# Patient Record
Sex: MALE | HISPANIC OR LATINO | Employment: STUDENT | URBAN - METROPOLITAN AREA
[De-identification: names, ages, dates, MRNs, and addresses within clinical notes are randomized per-mention and may not be internally consistent; named-entity substitution may affect disease eponyms.]

---

## 2020-08-11 ENCOUNTER — HOSPITAL ENCOUNTER (EMERGENCY)
Facility: HOSPITAL | Age: 40
End: 2020-08-12
Attending: EMERGENCY MEDICINE | Admitting: EMERGENCY MEDICINE
Payer: COMMERCIAL

## 2020-08-11 DIAGNOSIS — F32.A ANXIETY AND DEPRESSION: Primary | ICD-10-CM

## 2020-08-11 DIAGNOSIS — F41.9 ANXIETY AND DEPRESSION: Primary | ICD-10-CM

## 2020-08-11 DIAGNOSIS — G47.00 INSOMNIA: ICD-10-CM

## 2020-08-11 LAB
AMPHETAMINES SERPL QL SCN: NEGATIVE
BARBITURATES UR QL: NEGATIVE
BENZODIAZ UR QL: NEGATIVE
COCAINE UR QL: NEGATIVE
ETHANOL EXG-MCNC: 0 MG/DL
METHADONE UR QL: NEGATIVE
OPIATES UR QL SCN: NEGATIVE
OXYCODONE+OXYMORPHONE UR QL SCN: NEGATIVE
PCP UR QL: NEGATIVE
SARS-COV-2 RNA RESP QL NAA+PROBE: NEGATIVE
THC UR QL: POSITIVE

## 2020-08-11 PROCEDURE — 80307 DRUG TEST PRSMV CHEM ANLYZR: CPT | Performed by: EMERGENCY MEDICINE

## 2020-08-11 PROCEDURE — 87635 SARS-COV-2 COVID-19 AMP PRB: CPT | Performed by: EMERGENCY MEDICINE

## 2020-08-11 PROCEDURE — 99284 EMERGENCY DEPT VISIT MOD MDM: CPT | Performed by: EMERGENCY MEDICINE

## 2020-08-11 PROCEDURE — 99285 EMERGENCY DEPT VISIT HI MDM: CPT

## 2020-08-11 PROCEDURE — 82075 ASSAY OF BREATH ETHANOL: CPT | Performed by: EMERGENCY MEDICINE

## 2020-08-11 RX ORDER — ACETAMINOPHEN 325 MG/1
650 TABLET ORAL ONCE
Status: COMPLETED | OUTPATIENT
Start: 2020-08-11 | End: 2020-08-11

## 2020-08-11 RX ORDER — LORAZEPAM 1 MG/1
1 TABLET ORAL ONCE
Status: COMPLETED | OUTPATIENT
Start: 2020-08-11 | End: 2020-08-11

## 2020-08-11 RX ORDER — TRAZODONE HYDROCHLORIDE 50 MG/1
50 TABLET ORAL ONCE
Status: COMPLETED | OUTPATIENT
Start: 2020-08-11 | End: 2020-08-11

## 2020-08-11 RX ORDER — TRAZODONE HYDROCHLORIDE 50 MG/1
25 TABLET ORAL
COMMUNITY
End: 2020-08-18 | Stop reason: HOSPADM

## 2020-08-11 RX ORDER — FLUOXETINE 10 MG/1
10 TABLET, FILM COATED ORAL DAILY
COMMUNITY
End: 2020-08-18 | Stop reason: HOSPADM

## 2020-08-11 RX ORDER — GABAPENTIN 300 MG/1
300 CAPSULE ORAL 3 TIMES DAILY
COMMUNITY
End: 2020-08-18 | Stop reason: HOSPADM

## 2020-08-11 RX ADMIN — ACETAMINOPHEN 650 MG: 325 TABLET ORAL at 19:57

## 2020-08-11 RX ADMIN — LORAZEPAM 1 MG: 1 TABLET ORAL at 14:15

## 2020-08-11 RX ADMIN — NICOTINE POLACRILEX 2 MG: 2 GUM, CHEWING BUCCAL at 23:19

## 2020-08-11 RX ADMIN — TRAZODONE HYDROCHLORIDE 25 MG: 50 TABLET ORAL at 23:03

## 2020-08-11 RX ADMIN — LORAZEPAM 1 MG: 1 TABLET ORAL at 19:57
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
## 2020-08-11 NOTE — ED NOTES
Pt moved into the hallway  Pt has a family member at bedside  Pt has no complaints at present time  Will continue to monitor        Juan R Strickland RN  08/11/20 8614

## 2020-08-11 NOTE — ED NOTES
There are currently no beds in network  Patient would like to go to [facility] if possible  Bed search to resume once 201 is completed

## 2020-08-11 NOTE — ED NOTES
Patient reports he was trying to get to the [facility] but they discharged him  He reports he has been anxious and has been very manic and has not been eating or drinking and really struggling  He says the hospital gave him a prozac but they sent him home to follow with his pcp  Patient has been very tearful and feels his anxiety is out of control  Patient states he cannot function and has been in the ED 3 x and doesnt feel safe at home and reports increased depression/anxiety and has been hitting himself or the wall  He says when he gets mad he will hit himself or the wall  Patient also reports feeling hopeless and helpless and fears he will lose control  He was sent by the [place] and they informed him they do not have a bed and he should go inpatient locally since they cannot accomodate his needs  Patient agreeable to sign a 201 for treatment

## 2020-08-12 ENCOUNTER — HOSPITAL ENCOUNTER (INPATIENT)
Facility: HOSPITAL | Age: 40
LOS: 6 days | Discharge: HOME | DRG: 885 | End: 2020-08-18
Attending: PSYCHIATRY & NEUROLOGY | Admitting: PSYCHIATRY & NEUROLOGY
Payer: COMMERCIAL

## 2020-08-12 VITALS
TEMPERATURE: 98.6 F | SYSTOLIC BLOOD PRESSURE: 120 MMHG | HEART RATE: 71 BPM | OXYGEN SATURATION: 98 % | DIASTOLIC BLOOD PRESSURE: 71 MMHG | RESPIRATION RATE: 16 BRPM

## 2020-08-12 DIAGNOSIS — F41.9 ANXIETY AND DEPRESSION: ICD-10-CM

## 2020-08-12 DIAGNOSIS — F39 MOOD DISORDER (HCC): Primary | ICD-10-CM

## 2020-08-12 DIAGNOSIS — F43.10 PTSD (POST-TRAUMATIC STRESS DISORDER): ICD-10-CM

## 2020-08-12 DIAGNOSIS — F41.9 ANXIETY DISORDER: ICD-10-CM

## 2020-08-12 DIAGNOSIS — F32.A ANXIETY AND DEPRESSION: ICD-10-CM

## 2020-08-12 RX ORDER — IBUPROFEN 600 MG/1
600 TABLET ORAL EVERY 6 HOURS PRN
Status: DISCONTINUED | OUTPATIENT
Start: 2020-08-12 | End: 2020-08-18 | Stop reason: HOSPADM

## 2020-08-12 RX ORDER — RISPERIDONE 1 MG/1
1 TABLET, ORALLY DISINTEGRATING ORAL
Status: DISCONTINUED | OUTPATIENT
Start: 2020-08-12 | End: 2020-08-18 | Stop reason: HOSPADM

## 2020-08-12 RX ORDER — IBUPROFEN 600 MG/1
600 TABLET ORAL EVERY 6 HOURS PRN
Status: CANCELLED | OUTPATIENT
Start: 2020-08-12

## 2020-08-12 RX ORDER — MAGNESIUM HYDROXIDE/ALUMINUM HYDROXICE/SIMETHICONE 120; 1200; 1200 MG/30ML; MG/30ML; MG/30ML
30 SUSPENSION ORAL EVERY 4 HOURS PRN
Status: CANCELLED | OUTPATIENT
Start: 2020-08-12

## 2020-08-12 RX ORDER — OLANZAPINE 10 MG/1
10 INJECTION, POWDER, LYOPHILIZED, FOR SOLUTION INTRAMUSCULAR EVERY 8 HOURS PRN
Status: CANCELLED | OUTPATIENT
Start: 2020-08-12

## 2020-08-12 RX ORDER — BENZTROPINE MESYLATE 1 MG/ML
1 INJECTION INTRAMUSCULAR; INTRAVENOUS EVERY 6 HOURS PRN
Status: DISCONTINUED | OUTPATIENT
Start: 2020-08-12 | End: 2020-08-18 | Stop reason: HOSPADM

## 2020-08-12 RX ORDER — LORAZEPAM 2 MG/ML
2 INJECTION INTRAMUSCULAR EVERY 6 HOURS PRN
Status: CANCELLED | OUTPATIENT
Start: 2020-08-12

## 2020-08-12 RX ORDER — TRAZODONE HYDROCHLORIDE 50 MG/1
50 TABLET ORAL
Status: CANCELLED | OUTPATIENT
Start: 2020-08-12

## 2020-08-12 RX ORDER — FLUOXETINE 10 MG/1
10 CAPSULE ORAL DAILY
Status: DISCONTINUED | OUTPATIENT
Start: 2020-08-12 | End: 2020-08-12 | Stop reason: HOSPADM

## 2020-08-12 RX ORDER — ACETAMINOPHEN 325 MG/1
325 TABLET ORAL EVERY 6 HOURS PRN
Status: DISCONTINUED | OUTPATIENT
Start: 2020-08-12 | End: 2020-08-18 | Stop reason: HOSPADM

## 2020-08-12 RX ORDER — OLANZAPINE 10 MG/1
10 TABLET ORAL EVERY 8 HOURS PRN
Status: DISCONTINUED | OUTPATIENT
Start: 2020-08-12 | End: 2020-08-18 | Stop reason: HOSPADM

## 2020-08-12 RX ORDER — BENZTROPINE MESYLATE 1 MG/1
1 TABLET ORAL EVERY 6 HOURS PRN
Status: DISCONTINUED | OUTPATIENT
Start: 2020-08-12 | End: 2020-08-18 | Stop reason: HOSPADM

## 2020-08-12 RX ORDER — LORAZEPAM 2 MG/ML
2 INJECTION INTRAMUSCULAR EVERY 6 HOURS PRN
Status: DISCONTINUED | OUTPATIENT
Start: 2020-08-12 | End: 2020-08-18 | Stop reason: HOSPADM

## 2020-08-12 RX ORDER — HALOPERIDOL 5 MG
5 TABLET ORAL EVERY 6 HOURS PRN
Status: DISCONTINUED | OUTPATIENT
Start: 2020-08-12 | End: 2020-08-18 | Stop reason: HOSPADM

## 2020-08-12 RX ORDER — LORAZEPAM 1 MG/1
1 TABLET ORAL ONCE
Status: COMPLETED | OUTPATIENT
Start: 2020-08-12 | End: 2020-08-12

## 2020-08-12 RX ORDER — OLANZAPINE 10 MG/1
10 TABLET ORAL EVERY 8 HOURS PRN
Status: CANCELLED | OUTPATIENT
Start: 2020-08-12

## 2020-08-12 RX ORDER — ACETAMINOPHEN 325 MG/1
650 TABLET ORAL EVERY 4 HOURS PRN
Status: DISCONTINUED | OUTPATIENT
Start: 2020-08-12 | End: 2020-08-18 | Stop reason: HOSPADM

## 2020-08-12 RX ORDER — ACETAMINOPHEN 325 MG/1
325 TABLET ORAL EVERY 6 HOURS PRN
Status: CANCELLED | OUTPATIENT
Start: 2020-08-12

## 2020-08-12 RX ORDER — LORAZEPAM 1 MG/1
1 TABLET ORAL EVERY 6 HOURS PRN
Status: CANCELLED | OUTPATIENT
Start: 2020-08-12

## 2020-08-12 RX ORDER — HYDROXYZINE HYDROCHLORIDE 25 MG/1
25 TABLET, FILM COATED ORAL EVERY 6 HOURS PRN
Status: DISCONTINUED | OUTPATIENT
Start: 2020-08-12 | End: 2020-08-18 | Stop reason: HOSPADM

## 2020-08-12 RX ORDER — RISPERIDONE 1 MG/1
1 TABLET, ORALLY DISINTEGRATING ORAL
Status: CANCELLED | OUTPATIENT
Start: 2020-08-12

## 2020-08-12 RX ORDER — GABAPENTIN 300 MG/1
300 CAPSULE ORAL 3 TIMES DAILY
Status: DISCONTINUED | OUTPATIENT
Start: 2020-08-12 | End: 2020-08-12 | Stop reason: HOSPADM

## 2020-08-12 RX ORDER — HALOPERIDOL 5 MG/ML
5 INJECTION INTRAMUSCULAR EVERY 6 HOURS PRN
Status: CANCELLED | OUTPATIENT
Start: 2020-08-12

## 2020-08-12 RX ORDER — GABAPENTIN 300 MG/1
300 CAPSULE ORAL 3 TIMES DAILY
Status: DISCONTINUED | OUTPATIENT
Start: 2020-08-13 | End: 2020-08-13

## 2020-08-12 RX ORDER — HALOPERIDOL 5 MG/ML
5 INJECTION INTRAMUSCULAR EVERY 6 HOURS PRN
Status: DISCONTINUED | OUTPATIENT
Start: 2020-08-12 | End: 2020-08-18 | Stop reason: HOSPADM

## 2020-08-12 RX ORDER — FLUOXETINE 10 MG/1
10 CAPSULE ORAL DAILY
Status: CANCELLED | OUTPATIENT
Start: 2020-08-13

## 2020-08-12 RX ORDER — HALOPERIDOL 5 MG
5 TABLET ORAL EVERY 6 HOURS PRN
Status: CANCELLED | OUTPATIENT
Start: 2020-08-12

## 2020-08-12 RX ORDER — MAGNESIUM HYDROXIDE/ALUMINUM HYDROXICE/SIMETHICONE 120; 1200; 1200 MG/30ML; MG/30ML; MG/30ML
30 SUSPENSION ORAL EVERY 4 HOURS PRN
Status: DISCONTINUED | OUTPATIENT
Start: 2020-08-12 | End: 2020-08-18 | Stop reason: HOSPADM

## 2020-08-12 RX ORDER — OLANZAPINE 10 MG/1
10 INJECTION, POWDER, LYOPHILIZED, FOR SOLUTION INTRAMUSCULAR EVERY 8 HOURS PRN
Status: DISCONTINUED | OUTPATIENT
Start: 2020-08-12 | End: 2020-08-18 | Stop reason: HOSPADM

## 2020-08-12 RX ORDER — BENZTROPINE MESYLATE 0.5 MG/1
1 TABLET ORAL EVERY 6 HOURS PRN
Status: CANCELLED | OUTPATIENT
Start: 2020-08-12

## 2020-08-12 RX ORDER — FLUOXETINE 10 MG/1
10 CAPSULE ORAL DAILY
Status: DISCONTINUED | OUTPATIENT
Start: 2020-08-13 | End: 2020-08-13

## 2020-08-12 RX ORDER — HYDROXYZINE 50 MG/1
50 TABLET, FILM COATED ORAL EVERY 6 HOURS PRN
Status: DISCONTINUED | OUTPATIENT
Start: 2020-08-12 | End: 2020-08-18 | Stop reason: HOSPADM

## 2020-08-12 RX ORDER — BENZTROPINE MESYLATE 1 MG/ML
1 INJECTION INTRAMUSCULAR; INTRAVENOUS EVERY 6 HOURS PRN
Status: CANCELLED | OUTPATIENT
Start: 2020-08-12

## 2020-08-12 RX ORDER — OLANZAPINE 5 MG/1
10 TABLET, ORALLY DISINTEGRATING ORAL ONCE
Status: COMPLETED | OUTPATIENT
Start: 2020-08-12 | End: 2020-08-12

## 2020-08-12 RX ORDER — HYDROXYZINE HYDROCHLORIDE 25 MG/1
25 TABLET, FILM COATED ORAL EVERY 6 HOURS PRN
Status: CANCELLED | OUTPATIENT
Start: 2020-08-12

## 2020-08-12 RX ORDER — GABAPENTIN 300 MG/1
300 CAPSULE ORAL 3 TIMES DAILY
Status: CANCELLED | OUTPATIENT
Start: 2020-08-12

## 2020-08-12 RX ORDER — HYDROXYZINE HYDROCHLORIDE 25 MG/1
50 TABLET, FILM COATED ORAL EVERY 6 HOURS PRN
Status: CANCELLED | OUTPATIENT
Start: 2020-08-12

## 2020-08-12 RX ORDER — LORAZEPAM 1 MG/1
1 TABLET ORAL EVERY 6 HOURS PRN
Status: DISCONTINUED | OUTPATIENT
Start: 2020-08-12 | End: 2020-08-18 | Stop reason: HOSPADM

## 2020-08-12 RX ORDER — ACETAMINOPHEN 325 MG/1
650 TABLET ORAL EVERY 4 HOURS PRN
Status: CANCELLED | OUTPATIENT
Start: 2020-08-12

## 2020-08-12 RX ORDER — TRAZODONE HYDROCHLORIDE 50 MG/1
50 TABLET ORAL
Status: DISCONTINUED | OUTPATIENT
Start: 2020-08-12 | End: 2020-08-18 | Stop reason: HOSPADM

## 2020-08-12 RX ADMIN — OLANZAPINE 10 MG: 5 TABLET, ORALLY DISINTEGRATING ORAL at 13:50

## 2020-08-12 RX ADMIN — GABAPENTIN 300 MG: 300 CAPSULE ORAL at 10:24

## 2020-08-12 RX ADMIN — FLUOXETINE 10 MG: 10 CAPSULE ORAL at 10:24

## 2020-08-12 RX ADMIN — TRAZODONE HYDROCHLORIDE 50 MG: 50 TABLET ORAL at 22:11

## 2020-08-12 RX ADMIN — LORAZEPAM 1 MG: 1 TABLET ORAL at 10:27
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
## 2020-08-12 NOTE — ED NOTES
Spoke with [facility] and they will not be able to take patient because he has [facility] insurance       Bed search:   [facility]: can't take because precert not completed with [facility] yet    [place]: [facility]- no beds   [facility]: [facility] beds   [facility]: no answer, left message   [facility]:no beds   [facility]- no in network beds  [facility]

## 2020-08-12 NOTE — EMTALA/ACUTE CARE TRANSFER
[facility] EMERGENCY DEPARTMENT  [address] [facility] [address]  Dept: [phone]      NHJABQ TRANSFER CONSENT    NAME Tess SANTIAGO 1980                              MRN [id number]    I have been informed of my rights regarding examination, treatment, and transfer   by Dr Erika Spears MD    Benefits: Specialized equipment and/or services available at the receiving facility (Include comment)________________________(inpatient psychiatry)    Risks: Potential for delay in receiving treatment, Increased discomfort during transfer, Potential deterioration of medical condition      Consent for Transfer:  I acknowledge that my medical condition has been evaluated and explained to me by the emergency department physician or other qualified medical person and/or my attending physician, who has recommended that I be transferred to the service of  Accepting Physician: Devika Peraza   at [address] [place] [address] Name, [address] : [facility]  The above potential benefits of such transfer, the potential risks associated with such transfer, and the probable risks of not being transferred have been explained to me, and I fully understand them  The doctor has explained that, in my case, the benefits of transfer outweigh the risks  I agree to be transferred  I authorize the performance of emergency medical procedures and treatments upon me in both transit and upon arrival at the receiving facility  Additionally, I authorize the release of any and all medical records to the receiving facility and request they be transported with me, if possible  I understand that the safest mode of transportation during a medical emergency is an ambulance and that the Hospital advocates the use of this mode of transport   Risks of traveling to the receiving facility by car, including absence of medical control, life sustaining equipment, such as oxygen, and medical personnel has been explained to me and I fully understand them  ([place] CORRECT BOX BELOW)  [  ]  I consent to the stated transfer and to be transported by ambulance/helicopter  [  ]  I consent to the stated transfer, but refuse transportation by ambulance and accept full responsibility for my transportation by car  I understand the risks of non-ambulance transfers and I exonerate the Hospital and its staff from any deterioration in my condition that results from this refusal     X___________________________________________    DATE  20  TIME________  Signature of patient or legally responsible individual signing on patient behalf           RELATIONSHIP TO PATIENT_________________________          Provider Certification    [facility] 1980                              MRN [id number]    A medical screening exam was performed on the above named patient  Based on the examination:    Condition Necessitating Transfer The primary encounter diagnosis was Anxiety and depression  A diagnosis of Insomnia was also pertinent to this visit      Patient Condition: The patient has been stabilized such that within reasonable medical probability, no material deterioration of the patient condition or the condition of the unborn child(rosemarie) is likely to result from the transfer    Reason for Transfer:      Transfer Requirements: [address]   · Space available and qualified personnel available for treatment as acknowledged by    · Agreed to accept transfer and to provide appropriate medical treatment as acknowledged by       Odalis Pennington    · Appropriate medical records of the examination and treatment of the patient are provided at the time of transfer   [address] [facility], [address] _______  · Transfer will be performed by qualified personnel from    and appropriate transfer equipment as required, including the use of necessary and appropriate life support measures  Provider Certification: I have examined the patient and explained the following risks and benefits of being transferred/refusing transfer to the patient/family:  General risk, such as traffic hazards, adverse weather conditions, rough terrain or turbulence, possible failure of equipment (including vehicle or aircraft), or consequences of actions of persons outside the control of the transport personnel, Risk of worsening condition, The possibility of a transport vehicle being unavailable, Unanticipated needs of medical equipment and personnel during transport      Based on these reasonable risks and benefits to the patient and/or the unborn child(rosemarie), and based upon the information available at the time of the patients examination, I certify that the medical benefits reasonably to be expected from the provision of appropriate medical treatments at another medical facility outweigh the increasing risks, if any, to the individuals medical condition, and in the case of labor to the unborn child, from effecting the transfer      X____________________________________________ DATE 08/12/20        TIME_______      ORIGINAL - SEND TO MEDICAL RECORDS   COPY - SEND WITH PATIENT DURING TRANSFER

## 2020-08-12 NOTE — ED NOTES
Patient is accepted at [facility]  Patient is accepted by Gail Mejia  per  [address] is arranged with [facility]  Transportation is scheduled for 730pm    Nurse report is to be called to [phone]    prior to patient transfer

## 2020-08-12 NOTE — ED CARE HANDOFF
Emergency Department Sign Out Note        Sign out and transfer of care from Dr Eber Kim  See Separate Emergency Department note  The patient, Thaddeus Patel, was evaluated by the previous provider for anxiety and manic behavior  Workup Completed:  Patient medically cleared and evaluated by Crisis  201 signed for voluntary psychiatric hospitalization  Placement pending  ED Course / Workup Pending (followup): Patient's home medications ordered  Patient states worsening anxiety and requesting more ativan  1 mg oral ativan ordered  Patient later had to be given zyprexa for agitation with improvement  Patient accepted at [facility]  Procedures  MDM    Disposition  Final diagnoses:   Anxiety and depression   Insomnia     Time reflects when diagnosis was documented in both MDM as applicable and the Disposition within this note     Time User Action Codes Description Comment    8/11/2020  5:04 PM Fortunato Martínez [F41 9,  F32 9] Anxiety and depression     8/11/2020  5:04 PM Fortunato Martínez [G47 00] Insomnia       ED Disposition     ED Disposition Condition Date/Time Comment    Transfer to [address] [place] [address] Aug 11, 2020  1:56 PM Thaddeus Patel should be transferred out to inpatient psych and has been medically cleared          MD Documentation      Most Recent Value   Patient Condition  The patient has been stabilized such that within reasonable medical probability, no material deterioration of the patient condition or the condition of the unborn child(rosemarie) is likely to result from the transfer   Benefits of Transfer  Specialized equipment and/or services available at the receiving facility (Include comment)________________________ [inpatient psychiatry]   Risks of Transfer  Potential for delay in receiving treatment, Increased discomfort during transfer, Potential deterioration of medical condition   Accepting Physician  Mitchell Barragan Name, [address] Santhosh Ortiz Ace   Provider Certification  General risk, such as traffic hazards, adverse weather conditions, rough terrain or turbulence, possible failure of equipment (including vehicle or aircraft), or consequences of actions of persons outside the control of the transport personnel, Risk of worsening condition, The possibility of a transport vehicle being unavailable, Unanticipated needs of medical equipment and personnel during transport      RN Documentation      Most [facility] Name, [address] [place] [address]    None       Patient's Medications   Discharge Prescriptions    No medications on file     No discharge procedures on file         ED Provider  Electronically Signed by     Daljit Garcia MD  08/12/20 1756

## 2020-08-12 NOTE — ED NOTES
Bed search in progress  No beds at the [place]  No network beds available at this time      Bed search:   Helio Garay- clinical faxed for review  Amy Pate: [facility], [place]- no beds available   [place]: [facility]- clinical faxed for review  [address] [facility]: [place]- no beds  [facility]: [facility]- no beds  [place]: [facility]- no beds

## 2020-08-13 PROBLEM — F43.10 PTSD (POST-TRAUMATIC STRESS DISORDER): Status: ACTIVE | Noted: 2020-08-13

## 2020-08-13 PROBLEM — Z00.8 MEDICAL CLEARANCE FOR PSYCHIATRIC ADMISSION: Status: ACTIVE | Noted: 2020-08-13

## 2020-08-13 PROBLEM — F41.9 ANXIETY DISORDER: Status: ACTIVE | Noted: 2020-08-13

## 2020-08-13 PROBLEM — F39 MOOD DISORDER (HCC): Status: ACTIVE | Noted: 2020-08-13

## 2020-08-13 LAB
ALBUMIN SERPL BCP-MCNC: 3.3 G/DL (ref 3.5–5)
ALP SERPL-CCNC: 55 U/L (ref 46–116)
ALT SERPL W P-5'-P-CCNC: 21 U/L (ref 12–78)
ANION GAP SERPL CALCULATED.3IONS-SCNC: 6 MMOL/L (ref 4–13)
AST SERPL W P-5'-P-CCNC: 14 U/L (ref 5–45)
BASOPHILS # BLD AUTO: 0.04 THOUSANDS/ΜL (ref 0–0.1)
BASOPHILS NFR BLD AUTO: 1 % (ref 0–1)
BILIRUB SERPL-MCNC: 0.4 MG/DL (ref 0.2–1)
BILIRUB UR QL STRIP: NEGATIVE
BUN SERPL-MCNC: 13 MG/DL (ref 5–25)
CALCIUM SERPL-MCNC: 8.3 MG/DL (ref 8.3–10.1)
CHLORIDE SERPL-SCNC: 105 MMOL/L (ref 100–108)
CHOLEST SERPL-MCNC: 135 MG/DL (ref 50–200)
CLARITY UR: CLEAR
CO2 SERPL-SCNC: 31 MMOL/L (ref 21–32)
COLOR UR: YELLOW
CREAT SERPL-MCNC: 1.03 MG/DL (ref 0.6–1.3)
EOSINOPHIL # BLD AUTO: 0.11 THOUSAND/ΜL (ref 0–0.61)
EOSINOPHIL NFR BLD AUTO: 1 % (ref 0–6)
ERYTHROCYTE [DISTWIDTH] IN BLOOD BY AUTOMATED COUNT: 12.6 % (ref 11.6–15.1)
EST. AVERAGE GLUCOSE BLD GHB EST-MCNC: 97 MG/DL
GFR SERPL CREATININE-BSD FRML MDRD: 92 ML/MIN/1.73SQ M
GLUCOSE SERPL-MCNC: 91 MG/DL (ref 65–140)
GLUCOSE UR STRIP-MCNC: NEGATIVE MG/DL
HBA1C MFR BLD: 5 %
HCT VFR BLD AUTO: 45.4 % (ref 36.5–49.3)
HDLC SERPL-MCNC: 42 MG/DL
HGB BLD-MCNC: 15.4 G/DL (ref 12–17)
HGB UR QL STRIP.AUTO: NEGATIVE
IMM GRANULOCYTES # BLD AUTO: 0.03 THOUSAND/UL (ref 0–0.2)
IMM GRANULOCYTES NFR BLD AUTO: 0 % (ref 0–2)
KETONES UR STRIP-MCNC: NEGATIVE MG/DL
LDLC SERPL CALC-MCNC: 76 MG/DL (ref 0–100)
LEUKOCYTE ESTERASE UR QL STRIP: NEGATIVE
LYMPHOCYTES # BLD AUTO: 3.36 THOUSANDS/ΜL (ref 0.6–4.47)
LYMPHOCYTES NFR BLD AUTO: 39 % (ref 14–44)
MCH RBC QN AUTO: 30.9 PG (ref 26.8–34.3)
MCHC RBC AUTO-ENTMCNC: 33.9 G/DL (ref 31.4–37.4)
MCV RBC AUTO: 91 FL (ref 82–98)
MONOCYTES # BLD AUTO: 0.67 THOUSAND/ΜL (ref 0.17–1.22)
MONOCYTES NFR BLD AUTO: 8 % (ref 4–12)
NEUTROPHILS # BLD AUTO: 4.33 THOUSANDS/ΜL (ref 1.85–7.62)
NEUTS SEG NFR BLD AUTO: 51 % (ref 43–75)
NITRITE UR QL STRIP: NEGATIVE
NONHDLC SERPL-MCNC: 93 MG/DL
NRBC BLD AUTO-RTO: 0 /100 WBCS
PH UR STRIP.AUTO: 7.5 [PH]
PLATELET # BLD AUTO: 238 THOUSANDS/UL (ref 149–390)
PMV BLD AUTO: 11.2 FL (ref 8.9–12.7)
POTASSIUM SERPL-SCNC: 3.4 MMOL/L (ref 3.5–5.3)
PROT SERPL-MCNC: 6.8 G/DL (ref 6.4–8.2)
PROT UR STRIP-MCNC: NEGATIVE MG/DL
RBC # BLD AUTO: 4.98 MILLION/UL (ref 3.88–5.62)
RPR SER QL: NORMAL
SODIUM SERPL-SCNC: 142 MMOL/L (ref 136–145)
SP GR UR STRIP.AUTO: 1.03 (ref 1–1.03)
TRIGL SERPL-MCNC: 86 MG/DL
TSH SERPL DL<=0.05 MIU/L-ACNC: 1.01 UIU/ML (ref 0.36–3.74)
UROBILINOGEN UR QL STRIP.AUTO: 1 E.U./DL
WBC # BLD AUTO: 8.54 THOUSAND/UL (ref 4.31–10.16)

## 2020-08-13 PROCEDURE — 80053 COMPREHEN METABOLIC PANEL: CPT | Performed by: PHYSICIAN ASSISTANT

## 2020-08-13 PROCEDURE — 81003 URINALYSIS AUTO W/O SCOPE: CPT | Performed by: PHYSICIAN ASSISTANT

## 2020-08-13 PROCEDURE — 99253 IP/OBS CNSLTJ NEW/EST LOW 45: CPT | Performed by: INTERNAL MEDICINE

## 2020-08-13 PROCEDURE — 84443 ASSAY THYROID STIM HORMONE: CPT | Performed by: PHYSICIAN ASSISTANT

## 2020-08-13 PROCEDURE — 83036 HEMOGLOBIN GLYCOSYLATED A1C: CPT | Performed by: PHYSICIAN ASSISTANT

## 2020-08-13 PROCEDURE — 80061 LIPID PANEL: CPT | Performed by: PHYSICIAN ASSISTANT

## 2020-08-13 PROCEDURE — 86592 SYPHILIS TEST NON-TREP QUAL: CPT | Performed by: PHYSICIAN ASSISTANT

## 2020-08-13 PROCEDURE — 85025 COMPLETE CBC W/AUTO DIFF WBC: CPT | Performed by: PHYSICIAN ASSISTANT

## 2020-08-13 PROCEDURE — 99221 1ST HOSP IP/OBS SF/LOW 40: CPT | Performed by: PSYCHIATRY & NEUROLOGY

## 2020-08-13 RX ORDER — NICOTINE 21 MG/24HR
1 PATCH, TRANSDERMAL 24 HOURS TRANSDERMAL DAILY
Status: DISCONTINUED | OUTPATIENT
Start: 2020-08-13 | End: 2020-08-18 | Stop reason: HOSPADM

## 2020-08-13 RX ORDER — FLUOXETINE HYDROCHLORIDE 20 MG/1
20 CAPSULE ORAL DAILY
Status: DISCONTINUED | OUTPATIENT
Start: 2020-08-14 | End: 2020-08-18 | Stop reason: HOSPADM

## 2020-08-13 RX ORDER — GABAPENTIN 400 MG/1
400 CAPSULE ORAL 3 TIMES DAILY
Status: DISCONTINUED | OUTPATIENT
Start: 2020-08-13 | End: 2020-08-14

## 2020-08-13 RX ORDER — QUETIAPINE FUMARATE 100 MG/1
100 TABLET, FILM COATED ORAL
Status: DISCONTINUED | OUTPATIENT
Start: 2020-08-13 | End: 2020-08-14

## 2020-08-13 RX ADMIN — NICOTINE POLACRILEX 4 MG: 4 GUM, CHEWING BUCCAL at 12:20

## 2020-08-13 RX ADMIN — NICOTINE POLACRILEX 4 MG: 4 GUM, CHEWING BUCCAL at 16:54

## 2020-08-13 RX ADMIN — GABAPENTIN 400 MG: 400 CAPSULE ORAL at 16:30

## 2020-08-13 RX ADMIN — GABAPENTIN 300 MG: 300 CAPSULE ORAL at 08:43

## 2020-08-13 RX ADMIN — NICOTINE 1 PATCH: 21 PATCH, EXTENDED RELEASE TRANSDERMAL at 12:19

## 2020-08-13 RX ADMIN — LORAZEPAM 1 MG: 1 TABLET ORAL at 16:32

## 2020-08-13 RX ADMIN — FLUOXETINE 10 MG: 10 CAPSULE ORAL at 08:43

## 2020-08-13 RX ADMIN — QUETIAPINE FUMARATE 100 MG: 100 TABLET ORAL at 21:14

## 2020-08-13 RX ADMIN — LORAZEPAM 1 MG: 1 TABLET ORAL at 08:44

## 2020-08-13 RX ADMIN — GABAPENTIN 400 MG: 400 CAPSULE ORAL at 21:14

## 2020-08-13 NOTE — PROGRESS NOTES
Patient arrived with the following belongings:  -Caitlin Rizo  -socks  -jeans  -tshirt  -gloves  -2 tank tops  -soundproof headphones  -2 chargers  -tablet  -laptop  -journal   -sneakers  -7 plastic bags  -sunscreen  -pen  -highlighter  -waterproof backpack cover  -backpack  -construction vest  -school papers  -smoking cones  -waterbottle (tobacco inside)  -deoderant  -notepad  -wallet  -$3 00   -apple card  -movie card  -best buy card  -ae card   -target card   -bjs card  -veterans card  -[place] photo card   -ID  -EBT card  -2 septa cards  -school ID   - 4 bank cards

## 2020-08-13 NOTE — PROGRESS NOTES
08/13/20 1000 08/13/20 1100 08/13/20 1315   Activity/Group Checklist   Group Community meeting  (goal setting) Other (Comment)  (progresive muscle relaxation) Life Skills  (Effective Communication )   Attendance Attended Attended Attended   Attendance Duration (min) 31-45 16-30 46-60  (left group and returned )   Interactions Interacted appropriately  (engaged spontaneously in discussion) Interacted appropriately Interacted appropriately  (frequent interaction, deep disclosure)   Affect/Mood Appropriate Appropriate Appropriate   Goals Achieved Able to listen to others; Able to reflect/comment on own behavior;Able to engage in interactions; Discussed coping strategies; Identified feelings Able to engage in interactions; Able to reflect/comment on own behavior;Able to listen to others; Identified feelings; Discussed coping strategies Identified feelings; Identified triggers; Discussed coping strategies; Discussed self-esteem issues; Displayed empathy;Able to listen to others; Able to engage in interactions; Able to reflect/comment on own behavior;Able to manage/cope with feelings; Able to self-disclose; Able to recieve feedback; Able to give feedback to another; Other (Comment)  (focused on relationship, appears motivated & invested )      08/13/20 1440   Activity/Group Checklist   Group Admission/Discharge  (Admission self assessment)   Attendance Attended   Attendance Duration (min) 16-30  (spoke at length about relationship stressors)   Interactions Interacted appropriately   Affect/Mood Appropriate   Goals Achieved Identified feelings; Identified triggers; Discussed coping strategies; Able to engage in interactions; Able to reflect/comment on own behavior;Able to manage/cope with feelings  (goal for discharge is to be able to manage anxiety)   Pt attended all therapeutic groups today  He presented as pleasant and cooperative during interactions  Pt was social with select peers and frequently contributed to group discussion    Pt reflected on stressors leading to admission and verbalized his goal as to be able to manage anxiety more effectively
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
## 2020-08-13 NOTE — PLAN OF CARE
Problem: Depression  Goal: Treatment Goal: Demonstrate behavioral control of depressive symptoms, verbalize feelings of improved mood/affect, and adopt new coping skills prior to discharge  Outcome: Progressing  Goal: Verbalize thoughts and feelings  Description: Interventions:  - Assess and re-assess patient's level of risk   - Engage patient in 1:1 interactions, daily, for a minimum of 15 minutes   - Encourage patient to express feelings, fears, frustrations, hopes   Outcome: Progressing  Goal: Refrain from harming self  Description: Interventions:  - Monitor patient closely, per order   - Supervise medication ingestion, monitor effects and side effects   Outcome: Progressing  Goal: Attend and participate in unit activities, including therapeutic, recreational, and educational groups  Description: Interventions:  - Provide therapeutic and educational activities daily, encourage attendance and participation, and document same in the medical record   Outcome: Progressing     Problem: Anxiety  Goal: Anxiety is at manageable level  Description: Interventions:  - Assess and monitor patient's anxiety level  - Monitor for signs and symptoms (heart palpitations, chest pain, shortness of breath, headaches, nausea, feeling jumpy, restlessness, irritable, apprehensive)  - Collaborate with interdisciplinary team and initiate plan and interventions as ordered    - [place] patient to unit/surroundings  - Explain treatment plan  - Encourage participation in care  - Encourage verbalization of concerns/fears  - Identify coping mechanisms  - Assist in developing anxiety-reducing skills  - Administer/offer alternative therapies  - Limit or eliminate stimulants  Outcome: Progressing     Problem: INFECTION - ADULT  Goal: Absence or prevention of progression during hospitalization  Description: INTERVENTIONS:  - Assess and monitor for signs and symptoms of infection  - Monitor lab/diagnostic results  - Monitor all insertion sites, i e  indwelling lines, tubes, and drains  - Monitor endotracheal if appropriate and nasal secretions for changes in amount and color  - [place] appropriate cooling/warming therapies per order  - Administer medications as ordered  - Instruct and encourage patient and family to use good hand hygiene technique  - Identify and instruct in appropriate isolation precautions for identified infection/condition  Outcome: Progressing

## 2020-08-13 NOTE — PROGRESS NOTES
Pt awoke with sever anxiety, story score=25  States he always wakes up like this  Given prn ativan 1mg at 0844  Pt then went to [facility], and afterward states prn was effective and he was feeling better  Has been on the phone several times with boyfriend, at times tearful  Pt does appear depressed and overwhelmed, did not eat any lunch, but did eat 75% of bkfst  Adjusting to unit mileu  No episodes of acute anxiety or agitation

## 2020-08-13 NOTE — PROGRESS NOTES
Pt is noted to be at the phone for most of the beginning of the shift  Pt reports to this writer that "my anxiety just starts from the moment I wake up  I feel like I sit down I'll start shaking"  Pt rates his anxiety 2/10 and his depression 5/10  Pt reports his depression is because he is "not able to be with my family"  Pt denies SI/HI/AVH and contracts for safety  Pt denies any questions or concerns at the present time

## 2020-08-13 NOTE — TREATMENT PLAN
TREATMENT PLAN REVIEW - [address] [facility] 42 y o  [birth year] male MRN: [id number]    [address] [place] [address] Room / Bed: [facility] 895-60 Encounter: [id number]          Admit Date/Time:  8/12/2020  7:25 PM    Treatment Team: Attending Provider: Aryan Brown DO; Patient Care Assistant: Marcelino Dale; Registered Nurse: Anais Kitchen, RN; Medications RN: Ishan Zamorano RN; : Breonna Calderon; Occupational Therapy Assistant: Juan Fontana, [facility];  Registered Nurse: Brionna Ariza RN; Security: [place] Pop    Diagnosis: Principal Problem:    Mood disorder [facility])  Active Problems:    Anxiety disorder    PTSD (post-traumatic stress disorder)      Patient Strengths/Assets: average or above intelligence, cooperative, motivation for treatment/growth, patient is on a voluntary commitment    Patient Barriers/Limitations: relationship stressors    Short Term Goals: decrease in depressive symptoms, decrease in anxiety symptoms, decrease in self abusive behaviors, mood stabilization    Long Term Goals: improvement in anxiety, resolution of depressive symptoms, stabilization of mood, no self abusive behavior    Progress Towards Goals: starting psychiatric medications as prescribed    Recommended Treatment: medication management, patient medication education, group therapy, milieu therapy, continued Behavioral Health psychiatric evaluation/assessment process    Treatment Frequency: daily medication monitoring, group and milieu therapy daily, monitoring through interdisciplinary rounds, monitoring through weekly patient care conferences    Expected Discharge Date:  5-7 midnight    Discharge Plan: referrals as indicated    Treatment Plan Created/Updated By: Aryan Brown DO

## 2020-08-13 NOTE — CONSULTS
Consult- Birgit Swartz [birth year], 42 y o  male MRN: [id number]    Unit/Bed#: [facility] 626-51 Encounter: [id number]    Primary Care Provider: No primary care provider on file  Date and time admitted to hospital: 8/12/2020  7:25 PM      Inpatient consult for Medical Clearance for [address] patient  Consult performed by: Melissa Barrera MD  Consult ordered by: Chip Steiner PA-C          * Mood disorder [facility])  Assessment & Plan  Treatment as per Psychiatry    Anxiety disorder  Assessment & Plan  Treatment as per Psychiatry    Medical clearance for psychiatric admission  Assessment & Plan  Patient is medically stable for psychiatric treatment        History of Present Illness:    Birgit Swartz is a 42 y o  male who is originally admitted to the [facility] on 8/12/2020 due to increased anxiety, panic attacks  We are consulted for medical clearance  Patient reports increased anxiety, panic attacks and he was hurting himself over the weekend hitting walls  Currently complains of mild pain at the right wrist at the volar aspect after eating the wall over the weekend but he has full range of motion in the right wrist and on physical examination he has very mild tenderness  I doubt fracture  Otherwise patient medically feels well has no complaints  Review of Systems:    Review of Systems   Constitutional: Negative for fever  HENT: Negative for congestion  Eyes: Negative for visual disturbance  Respiratory: Negative for cough and shortness of breath  Cardiovascular: Negative for chest pain, palpitations and leg swelling  Gastrointestinal: Negative for abdominal pain, blood in stool and diarrhea  Endocrine: Negative for polydipsia and polyphagia  Genitourinary: Negative for difficulty urinating and dysuria  Skin: Negative for rash  Neurological: Negative for weakness, numbness and headaches  Hematological: Negative for adenopathy  Psychiatric/Behavioral: Positive for dysphoric mood   The patient is nervous/anxious  All other systems reviewed and are negative        Past Medical and Surgical History:     Past Medical History:   Diagnosis Date    H  pylori infection     TBI (traumatic brain injury) ([facility] [address] )        Past Surgical History:   Procedure Laterality Date    EYE SURGERY      HAND SURGERY      SECONDARY INTRAOCULAR LENSE IMPLANTATION         Meds/Allergies:      Current Facility-Administered Medications:     acetaminophen (TYLENOL) tablet 325 mg, 325 mg, Oral, Q6H PRN    acetaminophen (TYLENOL) tablet 650 mg, 650 mg, Oral, Q4H PRN    aluminum-magnesium hydroxide-simethicone (MYLANTA) 200-200-20 mg/5 mL oral suspension 30 mL, 30 mL, Oral, Q4H PRN    benztropine (COGENTIN) injection 1 mg, 1 mg, Intramuscular, Q6H PRN    benztropine (COGENTIN) tablet 1 mg, 1 mg, Oral, Q6H PRN    [START ON 8/14/2020] FLUoxetine (PROzac) capsule 20 mg, 20 mg, Oral, Daily    gabapentin (NEURONTIN) capsule 400 mg, 400 mg, Oral, TID, 400 mg at 08/13/20 1630    haloperidol (HALDOL) tablet 5 mg, 5 mg, Oral, Q6H PRN    haloperidol lactate (HALDOL) injection 5 mg, 5 mg, Intramuscular, Q6H PRN    hydrOXYzine HCL (ATARAX) tablet 25 mg, 25 mg, Oral, Q6H PRN    hydrOXYzine HCL (ATARAX) tablet 50 mg, 50 mg, Oral, Q6H PRN    ibuprofen (MOTRIN) tablet 600 mg, 600 mg, Oral, Q6H PRN    LORazepam (ATIVAN) injection 2 mg, 2 mg, Intramuscular, Q6H PRN    LORazepam (ATIVAN) tablet 1 mg, 1 mg, Oral, Q6H PRN, 1 mg at 08/13/20 1632    magnesium hydroxide (MILK OF MAGNESIA) 400 mg/5 mL oral suspension 30 mL, 30 mL, Oral, Daily PRN    nicotine (NICODERM CQ) 21 mg/24 hr TD 24 hr patch 1 patch, 1 patch, Transdermal, Daily, 1 patch at 08/13/20 1219    nicotine polacrilex (NICORETTE) gum 4 mg, 4 mg, Oral, Q2H PRN, 4 mg at 08/13/20 1654    OLANZapine (ZyPREXA) IM injection 10 mg, 10 mg, Intramuscular, Q8H PRN    OLANZapine (ZyPREXA) tablet 10 mg, 10 mg, Oral, Q8H PRN    QUEtiapine (SEROquel) tablet 100 mg, 100 mg, Oral, HS    risperiDONE (RisperDAL M-TABS) dispersible tablet 1 mg, 1 mg, Oral, Q3H PRN    traZODone (DESYREL) tablet 50 mg, 50 mg, Oral, HS PRN, 50 mg at 08/12/20 2211    Prior to Admission Medications   Prescriptions Last Dose Informant Patient Reported? Taking? FLUoxetine (PROzac) 10 MG tablet   Yes No   Sig: Take 10 mg by mouth daily   gabapentin (NEURONTIN) 300 mg capsule   Yes No   Sig: Take 300 mg by mouth 3 (three) times a day   traZODone (DESYREL) 50 mg tablet   Yes No   Sig: Take 25 mg by mouth daily at bedtime      Facility-Administered Medications: None       Allergies: No Known Allergies    Social History:     Substance Use History:   Social History     Substance and Sexual Activity   Alcohol Use Not Currently    Frequency: Never     Social History     Tobacco Use   Smoking Status Never Smoker   Smokeless Tobacco Current User    Types: Chew     Social History     Substance and Sexual Activity   Drug Use Never       Family History:    History reviewed  No pertinent family history  Physical Exam:     Vitals:   Blood Pressure: 135/69 (08/13/20 1045)  Pulse: 97 (08/13/20 1045)  Temperature: 97 9 °F (36 6 °C) (08/13/20 1045)  Temp Source: Tympanic (08/13/20 1045)  Respirations: 18 (08/13/20 1045)  Height: 5' 10" (177 8 cm) (08/12/20 1939)  Weight - Scale: 79 7 kg (175 lb 12 8 oz) (08/12/20 1939)  SpO2: 96 % (08/12/20 1939)    Physical Exam  Vitals signs reviewed  Constitutional:       General: He is not in acute distress  Appearance: He is well-developed  HENT:      Head: Normocephalic  Eyes:      Conjunctiva/sclera: Conjunctivae normal    Neck:      Musculoskeletal: Neck supple  Cardiovascular:      Rate and Rhythm: Normal rate and regular rhythm  Heart sounds: No murmur  Pulmonary:      Effort: Pulmonary effort is normal  No respiratory distress  Breath sounds: No wheezing or rales  Abdominal:      General: Bowel sounds are normal  There is no distension        Palpations: Abdomen is soft  Tenderness: There is no abdominal tenderness  Musculoskeletal:         General: Tenderness (Right volar aspect the wrist is tender are patient had a wall) present  Skin:     General: Skin is warm and dry  Findings: No rash  Neurological:      Mental Status: He is alert and oriented to person, place, and time  Cranial Nerves: No cranial nerve deficit  Additional Data:     Lab and imaging results: I personally reviewed them    Results from last 7 days   Lab Units 08/13/20  0619   WBC Thousand/uL 8 54   HEMOGLOBIN g/dL 15 4   HEMATOCRIT % 45 4   PLATELETS Thousands/uL 238   NEUTROS PCT % 51   LYMPHS PCT % 39   MONOS PCT % 8   EOS PCT % 1     Results from last 7 days   Lab Units 08/13/20  0619   POTASSIUM mmol/L 3 4*   CHLORIDE mmol/L 105   CO2 mmol/L 31   BUN mg/dL 13   CREATININE mg/dL 1 03   CALCIUM mg/dL 8 3   ALK PHOS U/L 55   ALT U/L 21   AST U/L 14             Lab Results   Component Value Date/Time    HGBA1C 5 0 08/13/2020 06:19 AM       No orders to display       Tiffany Barlow MD    ** Please Note: This note has been constructed using a voice recognition system   **
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
## 2020-08-13 NOTE — PLAN OF CARE
Problem: INFECTION - ADULT  Goal: Absence or prevention of progression during hospitalization  Description: INTERVENTIONS:  - Assess and monitor for signs and symptoms of infection  - Monitor lab/diagnostic results  - Monitor all insertion sites, i e  indwelling lines, tubes, and drains  - Monitor endotracheal if appropriate and nasal secretions for changes in amount and color  - [place] appropriate cooling/warming therapies per order  - Administer medications as ordered  - Instruct and encourage patient and family to use good hand hygiene technique  - Identify and instruct in appropriate isolation precautions for identified infection/condition  Outcome: Progressing

## 2020-08-14 PROCEDURE — 99232 SBSQ HOSP IP/OBS MODERATE 35: CPT | Performed by: PSYCHIATRY & NEUROLOGY

## 2020-08-14 RX ORDER — GABAPENTIN 300 MG/1
600 CAPSULE ORAL 3 TIMES DAILY
Status: DISCONTINUED | OUTPATIENT
Start: 2020-08-14 | End: 2020-08-18 | Stop reason: HOSPADM

## 2020-08-14 RX ORDER — QUETIAPINE FUMARATE 200 MG/1
200 TABLET, FILM COATED ORAL
Status: DISCONTINUED | OUTPATIENT
Start: 2020-08-14 | End: 2020-08-18 | Stop reason: HOSPADM

## 2020-08-14 RX ADMIN — NICOTINE POLACRILEX 4 MG: 4 GUM, CHEWING BUCCAL at 10:54

## 2020-08-14 RX ADMIN — NICOTINE POLACRILEX 4 MG: 4 GUM, CHEWING BUCCAL at 08:04

## 2020-08-14 RX ADMIN — HYDROXYZINE HYDROCHLORIDE 25 MG: 25 TABLET ORAL at 14:24

## 2020-08-14 RX ADMIN — NICOTINE 1 PATCH: 21 PATCH, EXTENDED RELEASE TRANSDERMAL at 08:02

## 2020-08-14 RX ADMIN — GABAPENTIN 600 MG: 300 CAPSULE ORAL at 16:27

## 2020-08-14 RX ADMIN — NICOTINE POLACRILEX 4 MG: 4 GUM, CHEWING BUCCAL at 14:27

## 2020-08-14 RX ADMIN — GABAPENTIN 600 MG: 300 CAPSULE ORAL at 21:44

## 2020-08-14 RX ADMIN — QUETIAPINE FUMARATE 200 MG: 200 TABLET ORAL at 21:44

## 2020-08-14 RX ADMIN — ALUMINUM HYDROXIDE, MAGNESIUM HYDROXIDE, AND SIMETHICONE 30 ML: 200; 200; 20 SUSPENSION ORAL at 17:49

## 2020-08-14 RX ADMIN — NICOTINE POLACRILEX 4 MG: 4 GUM, CHEWING BUCCAL at 20:04

## 2020-08-14 RX ADMIN — ACETAMINOPHEN 650 MG: 325 TABLET, FILM COATED ORAL at 14:22

## 2020-08-14 RX ADMIN — GABAPENTIN 400 MG: 400 CAPSULE ORAL at 08:02

## 2020-08-14 RX ADMIN — FLUOXETINE 20 MG: 20 CAPSULE ORAL at 08:02

## 2020-08-14 RX ADMIN — LORAZEPAM 1 MG: 1 TABLET ORAL at 08:04

## 2020-08-14 NOTE — PROGRESS NOTES
Progress Note - Behavioral Health   Criselda Tyson 42 y o  male MRN: [id number]  Unit/Bed#: [facility] [place] 591-04 Encounter: [id number]    Assessment/Plan   Principal Problem:    Mood disorder [facility])  Active Problems:    Anxiety disorder    PTSD (post-traumatic stress disorder)    Medical clearance for psychiatric admission      Subjective:  Pt is cooperative but anxious  Constantly shaking leg  He says his anxiety is high and feels the ativan is helpful  Encouraged patient to take the atarax rather  He says he slept better and had positive dreams as well which he says he has not had in a while  Pt denies SI/HI/AH/VH  He has been eating meals  He has been med compliant and without any serious side effects  Pt seems still edgy but is cooperative and motivated  Pt worried about his hand because still painful  He reports showing it to the medical doctor last night  Pt has tylenol and advil as PRNs  Pt has not punched himself so far while in the hospital  Pt verbally states that he is appreciative of care                Current Medications:  Current Facility-Administered Medications   Medication Dose Route Frequency    acetaminophen (TYLENOL) tablet 325 mg  325 mg Oral Q6H PRN    acetaminophen (TYLENOL) tablet 650 mg  650 mg Oral Q4H PRN    aluminum-magnesium hydroxide-simethicone (MYLANTA) 200-200-20 mg/5 mL oral suspension 30 mL  30 mL Oral Q4H PRN    benztropine (COGENTIN) injection 1 mg  1 mg Intramuscular Q6H PRN    benztropine (COGENTIN) tablet 1 mg  1 mg Oral Q6H PRN    FLUoxetine (PROzac) capsule 20 mg  20 mg Oral Daily    gabapentin (NEURONTIN) capsule 400 mg  400 mg Oral TID    haloperidol (HALDOL) tablet 5 mg  5 mg Oral Q6H PRN    haloperidol lactate (HALDOL) injection 5 mg  5 mg Intramuscular Q6H PRN    hydrOXYzine HCL (ATARAX) tablet 25 mg  25 mg Oral Q6H PRN    hydrOXYzine HCL (ATARAX) tablet 50 mg  50 mg Oral Q6H PRN    ibuprofen (MOTRIN) tablet 600 mg  600 mg Oral Q6H PRN    LORazepam (ATIVAN) injection 2 mg  2 mg Intramuscular Q6H PRN    LORazepam (ATIVAN) tablet 1 mg  1 mg Oral Q6H PRN    magnesium hydroxide (MILK OF MAGNESIA) 400 mg/5 mL oral suspension 30 mL  30 mL Oral Daily PRN    nicotine (NICODERM CQ) 21 mg/24 hr TD 24 hr patch 1 patch  1 patch Transdermal Daily    nicotine polacrilex (NICORETTE) gum 4 mg  4 mg Oral Q2H PRN    OLANZapine (ZyPREXA) IM injection 10 mg  10 mg Intramuscular Q8H PRN    OLANZapine (ZyPREXA) tablet 10 mg  10 mg Oral Q8H PRN    QUEtiapine (SEROquel) tablet 100 mg  100 mg Oral HS    risperiDONE (RisperDAL M-TABS) dispersible tablet 1 mg  1 mg Oral Q3H PRN    traZODone (DESYREL) tablet 50 mg  50 mg Oral HS PRN       Behavioral Health Medications: all current active meds have been reviewed  Vitals:  Vitals:    08/14/20 0755   BP: 138/85   Pulse: 94   Resp: 16   Temp: (!) 97 2 °F (36 2 °C)   SpO2:        Laboratory results:    I have personally reviewed all pertinent laboratory/tests results    Most Recent Labs:   Lab Results   Component Value Date    WBC 8 54 08/13/2020    RBC 4 98 08/13/2020    HGB 15 4 08/13/2020    HCT 45 4 08/13/2020     08/13/2020    RDW 12 6 08/13/2020    NEUTROABS 4 33 08/13/2020    SODIUM 142 08/13/2020    K 3 4 (L) 08/13/2020     08/13/2020    CO2 31 08/13/2020    BUN 13 08/13/2020    CREATININE 1 03 08/13/2020    GLUC 91 08/13/2020    CALCIUM 8 3 08/13/2020    AST 14 08/13/2020    ALT 21 08/13/2020    ALKPHOS 55 08/13/2020    TP 6 8 08/13/2020    ALB 3 3 (L) 08/13/2020    TBILI 0 40 08/13/2020    CHOLESTEROL 135 08/13/2020    HDL 42 08/13/2020    TRIG 86 08/13/2020    LDLCALC 76 08/13/2020    NONHDLC 93 08/13/2020    [id number] 1 010 08/13/2020    RPR Non-Reactive 08/13/2020    HGBA1C 5 0 08/13/2020    EAG 97 08/13/2020       Psychiatric Review of Systems:  Behavior over the last 24 hours:  improving  Sleep: improving  Appetite: normal  Medication side effects: No  ROS: hand pain, all others negative    Mental Status Evaluation:  Appearance:  casually dressed   Behavior:  cooperative  polite shows appreciation   Speech:  normal pitch and normal volume   Mood:  anxious and depressed   Affect:  constricted   Thought Process:  circumstantial   Thought Content:  no overt delusions   Perceptual Disturbances: pt denies   Risk Potential: Suicidal Ideations none, Homicidal Ideations none and Potential for Aggression No   Sensorium:  person, place and time/date   Cognition:  recent and remote memory grossly intact   Consciousness:  alert and awake    Attention: attention span appeared shorter than expected for age   Insight:  partial    Judgment: partial   Gait/Station: normal gait/station and normal balance   Motor Activity: no abnormal movements     Progress Toward Goals: slow progress    Recommended Treatment: Continue with group therapy, milieu therapy and occupational therapy  1  Will increase seroquel to 200 mg q hs for mood  2  increase neurontin to 600 mg TID for anxiety  Risks, benefits and possible side effects of Medications:   Risks, benefits, and possible side effects of medications explained to patient and patient verbalizes understanding

## 2020-08-14 NOTE — PLAN OF CARE
Problem: Ineffective Coping  Goal: Identifies ineffective coping skills  Outcome: Progressing  Goal: Identifies healthy coping skills  Outcome: Progressing     Problem: Depression  Goal: Treatment Goal: Demonstrate behavioral control of depressive symptoms, verbalize feelings of improved mood/affect, and adopt new coping skills prior to discharge  Outcome: Progressing  Goal: Verbalize thoughts and feelings  Description: Interventions:  - Assess and re-assess patient's level of risk   - Engage patient in 1:1 interactions, daily, for a minimum of 15 minutes   - Encourage patient to express feelings, fears, frustrations, hopes   Outcome: Progressing  Goal: Refrain from harming self  Description: Interventions:  - Monitor patient closely, per order   - Supervise medication ingestion, monitor effects and side effects   Outcome: Progressing  Goal: Attend and participate in unit activities, including therapeutic, recreational, and educational groups  Description: Interventions:  - Provide therapeutic and educational activities daily, encourage attendance and participation, and document same in the medical record   Outcome: Progressing     Problem: Anxiety  Goal: Anxiety is at manageable level  Description: Interventions:  - Assess and monitor patient's anxiety level  - Monitor for signs and symptoms (heart palpitations, chest pain, shortness of breath, headaches, nausea, feeling jumpy, restlessness, irritable, apprehensive)  - Collaborate with interdisciplinary team and initiate plan and interventions as ordered    - [place] patient to unit/surroundings  - Explain treatment plan  - Encourage participation in care  - Encourage verbalization of concerns/fears  - Identify coping mechanisms  - Assist in developing anxiety-reducing skills  - Administer/offer alternative therapies  - Limit or eliminate stimulants  Outcome: Progressing     Problem: INFECTION - ADULT  Goal: Absence or prevention of progression during hospitalization  Description: INTERVENTIONS:  - Assess and monitor for signs and symptoms of infection  - Monitor lab/diagnostic results  - Monitor all insertion sites, i e  indwelling lines, tubes, and drains  - Monitor endotracheal if appropriate and nasal secretions for changes in amount and color  - [place] appropriate cooling/warming therapies per order  - Administer medications as ordered  - Instruct and encourage patient and family to use good hand hygiene technique  - Identify and instruct in appropriate isolation precautions for identified infection/condition  Outcome: Progressing

## 2020-08-14 NOTE — PROGRESS NOTES
Polite and cooperative during interaction  Denies SI/HI  Improved depression and sadness  Elevated anxiety  C/o right hand pain, no swelling, bruising, active ROM- patient had informed medical MD who assessed the previous night  Offered ice and tylenol  Patient states when he was discharged from the [place] he had punched the door and wall with his hand in frustration

## 2020-08-14 NOTE — PROGRESS NOTES
Pleasant, calm and cooperative  Visible in the milieu, seen attending groups  Denies SI/HI/AVH  Reports fluctuating anxiety, usually worse in the morning  PRN medications have been effective for anxiety  Compliant with scheduled medications  Denies questions/concerns

## 2020-08-14 NOTE — PROGRESS NOTES
Status: Pt tearful at times on the phone  He reported increased anxiety & received PRN medication  Pt is social with select peers & slept overnight      Medication: meds started / PRN - Ativan(x2)  D/C: early next week / CM will schedule aftercare at [facility]     08/14/20 1240 [facility]   Team Meeting   Meeting Type Daily Rounds   Team Members Present   Team Members Present Physician;Nurse;;Occupational Therapist   Physician Team Member Dr Xavier Yusuf / Dr Thang Bella / Bradley Gayle Team Member [facility] Management Team Member [id number]   OT Team Member Shabbir   Patient/Family Present   Patient Present No   Patient's Family Present No

## 2020-08-14 NOTE — PROGRESS NOTES
Patient belongings dropped off by visitor;  -[address]  -shoes    Pt belongings at bedside;  -[address] [facility]

## 2020-08-14 NOTE — CASE MANAGEMENT
CM met with Pt to review his appointments at that [place] that CM was waiting for a returned call regarding possible groups and/or individual therapy  Pt agreeable & had no questions  CM contacted Pt's mom, Gus Lockett, @ [phone] who reported that she had spoken with Pt  She said that his anxiety goes really high over little things & he gets irrate over the littlest things  She said that he has somewhat always been this way, but it seems to have gotten increasingly worse  CM reviewed d/c is tentative for Tues & that CM would call on Monday to provide an update  Violeta agreeable & said that she would provide transportation at discharge

## 2020-08-14 NOTE — CASE MANAGEMENT
SHWETA contacted the [facility] @ [phone] & spoke with Maricel Ramos in scheduling who reported that Pt had a phone appointment with his psychiatrist today & a face to face appointment with her on Monday at 10 AM   CM asked that both of those be cancelled & asked to reschedule  Maricel Ramos was unable to do so, but was able to confirm Pt was seen by his PCP in March 2020  His next appt is 10/13 at 10:00 AM via telephone  Maricel Ramos put CM on hold & contacted the [place] [id number] dept  She said that Pt is now scheduled with Dr Martin Doll for 8/19 at 10 AM in person  CM asked about a therapist or support groups offered & she reported that Pt has a therapist, but she couldn't see who  She said that Pt should just talk with Dr Martin Doll about that at his appointment  CM found a phone number for the mental health clinic & called [phone] & left a message seeking a return call regarding therapy & supports groups for Pt

## 2020-08-14 NOTE — PROGRESS NOTES
08/14/20 1000 08/14/20 1100 08/14/20 1315   Activity/Group Checklist   Group Community meeting  (motivational goal setting) Nursing Education  (Metabolic Syndrome) Life Skills  (self reflection in recovery)   Attendance Attended Attended Attended   Attendance Duration (min) 31-45 16-30 Greater than 60   Interactions Interacted appropriately Interacted appropriately Interacted appropriately   Affect/Mood Appropriate Appropriate Appropriate   Goals Achieved Identified feelings; Identified triggers; Discussed coping strategies; Discussed self-esteem issues; Able to listen to others; Able to engage in interactions; Able to reflect/comment on own behavior;Able to manage/cope with feelings; Able to self-disclose; Able to recieve feedback; Able to give feedback to another Identified feelings; Able to listen to others; Able to engage in interactions Identified feelings; Discussed coping strategies; Able to listen to others; Able to engage in interactions; Able to reflect/comment on own behavior;Able to manage/cope with feelings;Verbalized increased hopefulness; Able to recieve feedback; Able to give feedback to another   Pt attended all therapeutic groups today  He presented as pleasant and cooperative  Pt engaged frequently in group discussion and was social with peers  He was able to identify barriers to wellness but also strengths/assets to overcome barriers

## 2020-08-14 NOTE — PROGRESS NOTES
Treatment Plan Meeting:  Diagnosis of mood disorder, anxiety disorder, & PTSD reviewed  Discussed short term goals for decrease in depression, anxiety, decrease in self abusive behaviors, & mood stabilization  Pt reported that he did sleep well last night with the addition of Seroquel  He said that he slept from approximately 10:30 pm to 7:00 am   Pt said that he had dreams(good) for the first time in a long time, & he actually felt rested when he woke up  Pt had concerns about his right hand, no swelling or areas of erythema noted  Pt reported he had punched a door while at the [place]  Pt did say medical saw him last night & examined it  Pt was offered ibuprofen & tylenol for pain  At this time discharge is tentative for early next week  All parties in agreement & treatment plan was signed       08/14/20 0845   Team Meeting   Meeting Type Tx Team Meeting   Initial Conference Date 08/14/20   Next Conference Date 09/09/20   Team Members Present   Physician Team Member Dr Candida Lerner / Dr Maribel Garcia Team Member DE[facility] Management Team Member Denisha   Patient/Family Present   Patient Present Yes   Patient's Family Present No

## 2020-08-15 PROCEDURE — 99232 SBSQ HOSP IP/OBS MODERATE 35: CPT | Performed by: PSYCHIATRY & NEUROLOGY

## 2020-08-15 RX ADMIN — QUETIAPINE FUMARATE 200 MG: 200 TABLET ORAL at 21:47

## 2020-08-15 RX ADMIN — NICOTINE 1 PATCH: 21 PATCH, EXTENDED RELEASE TRANSDERMAL at 08:28

## 2020-08-15 RX ADMIN — HALOPERIDOL 5 MG: 5 TABLET ORAL at 10:58

## 2020-08-15 RX ADMIN — GABAPENTIN 600 MG: 300 CAPSULE ORAL at 16:28

## 2020-08-15 RX ADMIN — GABAPENTIN 600 MG: 300 CAPSULE ORAL at 21:47

## 2020-08-15 RX ADMIN — GABAPENTIN 600 MG: 300 CAPSULE ORAL at 08:26

## 2020-08-15 RX ADMIN — FLUOXETINE 20 MG: 20 CAPSULE ORAL at 08:26

## 2020-08-15 RX ADMIN — LORAZEPAM 1 MG: 1 TABLET ORAL at 08:28

## 2020-08-15 RX ADMIN — NICOTINE POLACRILEX 4 MG: 4 GUM, CHEWING BUCCAL at 08:56

## 2020-08-15 NOTE — QUICK NOTE
Pt's mother brought in the following items:    1 pair of shorts (pt gave permission to cut strings out)  1 t-shirt  3 pairs of socks  Toothbrush  Toothpaste  Laptop   Variety of snacks  3 gatorade

## 2020-08-15 NOTE — PROGRESS NOTES
Remains visible in the milieu, social with select peers  No irritability or agitation this shift  Denies SI  Anxiety improved with PRN medications  Compliant with scheduled medications  C/o right wrist pain earlier today  Said wrist assessed by CRNP, continue to monitor at this time (no swelling, full ROM), no XR needed

## 2020-08-15 NOTE — PROGRESS NOTES
Pt pleasant and cooperative  Slept well over night  Anxiety is elevated this morning, PRN medication effective  Denies SI  Compliant with medications  Denies questions and concerns

## 2020-08-15 NOTE — QUICK NOTE
Patient seen and examined for right wrist/hand pain after punching a wall after the [facility] would not treat him  On exam there is no swelling  He has full range of motion  He was able to participate in yoga today without difficulty  Pain is managed appropriately with as needed Tylenol  No indication for x-ray  HAN Henry

## 2020-08-15 NOTE — PLAN OF CARE
Problem: Depression  Goal: Treatment Goal: Demonstrate behavioral control of depressive symptoms, verbalize feelings of improved mood/affect, and adopt new coping skills prior to discharge  Outcome: Progressing  Goal: Verbalize thoughts and feelings  Description: Interventions:  - Assess and re-assess patient's level of risk   - Engage patient in 1:1 interactions, daily, for a minimum of 15 minutes   - Encourage patient to express feelings, fears, frustrations, hopes   Outcome: Progressing  Goal: Refrain from harming self  Description: Interventions:  - Monitor patient closely, per order   - Supervise medication ingestion, monitor effects and side effects   Outcome: Progressing  Goal: Attend and participate in unit activities, including therapeutic, recreational, and educational groups  Description: Interventions:  - Provide therapeutic and educational activities daily, encourage attendance and participation, and document same in the medical record   Outcome: Progressing     Problem: Anxiety  Goal: Anxiety is at manageable level  Description: Interventions:  - Assess and monitor patient's anxiety level  - Monitor for signs and symptoms (heart palpitations, chest pain, shortness of breath, headaches, nausea, feeling jumpy, restlessness, irritable, apprehensive)  - Collaborate with interdisciplinary team and initiate plan and interventions as ordered    - [place] patient to unit/surroundings  - Explain treatment plan  - Encourage participation in care  - Encourage verbalization of concerns/fears  - Identify coping mechanisms  - Assist in developing anxiety-reducing skills  - Administer/offer alternative therapies  - Limit or eliminate stimulants  Outcome: Progressing     Problem: INFECTION - ADULT  Goal: Absence or prevention of progression during hospitalization  Description: INTERVENTIONS:  - Assess and monitor for signs and symptoms of infection  - Monitor lab/diagnostic results  - Monitor all insertion sites, i e  indwelling lines, tubes, and drains  - Monitor endotracheal if appropriate and nasal secretions for changes in amount and color  - [place] appropriate cooling/warming therapies per order  - Administer medications as ordered  - Instruct and encourage patient and family to use good hand hygiene technique  - Identify and instruct in appropriate isolation precautions for identified infection/condition  Outcome: Progressing

## 2020-08-15 NOTE — QUICK NOTE
Pt appears anxious this morning, reports anxiety much worse in morning  Received PRN lorazepam 1mg po at 0828  Upon reassessment pt observed using phone in hallway, appears more calm and relaxed at this time

## 2020-08-15 NOTE — PROGRESS NOTES
Progress Note - Behavioral Health     Mag Rapp 42 y o  male MRN: @MRN   Unit/Bed#: [facility] 939-16 Encounter: [id number]    Per nursing report and sign out, patient has fluctuating depression, anxiety, no SI or HI    Mag Rapp reports today that "I felt the best I had in a long time yesterday"  Reduced PRN needsanxiety is in AM usually  Trigger includes partner at home, but still tryin gto understand all triggers  "We are not meant to be together"  Took seroquel/gabapentin last night  Had NM of falling last night  Depression and anxiety improving  Most anxiety about how to break up with BF  No SI, "I definitely want to live"    Constipation, h/a pain 5/10 today, manageable  BM yesterday finally      Energy: improving  Sleep: some NM last night, but overal improving  Appetite: improved  Medication side effects: dry mouth   ROS: all other systems are negative    Mental Status Evaluation:    Appearance:  age appropriate   Behavior:  pleasant, cooperative, with good eye contact   Speech:  Normal volume, regular rate and rhythm   Mood:  depressed and anxious   Affect:  brighter with some improvement       Thought Process:  circumferential   Associations: intact associations   Thought Content:  normal and appropriate, negative thinking and cognitive distortions   Perceptual Disturbances: no auditory or visual hallcunations   Risk Potential: Suicidal ideation - None  Homicidal ideation - None  Potential for aggression - No   Sensorium:  A&Ox3   Cognition:  grossly intact   Consciousness:  Alert & Awake   Memory:  recent and remote memory grossly intact   Attention: attention span and concentration are age appropriate           Insight:  improving   Judgment: improving   Muscle Strength  Muscle Tone normal  normal   Gait/Station: normal gait/station with good balance   Motor Activity: no abnormal movements       Vital signs in last 24 hours:    Temp:  [96 2 °F (35 7 °C)-98 °F (36 7 °C)] 96 2 °F (35 7 °C)  HR: [18-90] 18  Resp:  [17-20] 20  BP: (107-145)/(67-79) 107/67    Laboratory results:  I have personally reviewed all pertinent laboratory/tests results  Assessment/Plan   Principal Problem:    Mood disorder [facility])  Active Problems:    Anxiety disorder    PTSD (post-traumatic stress disorder)    Medical clearance for psychiatric admission      Melisa gu is making progress    Recommended Treatment:     Planned medication and treatment changes: All current active medications have been reviewed    Encourage group therapy, milieu therapy and occupational therapy  Behavioral Health checks as unit standard unless ordered or noted otherwise    Current Facility-Administered Medications   Medication Dose Route Frequency Provider Last Rate    acetaminophen  325 mg Oral Q6H PRN [place] Shiver, PA-C      acetaminophen  650 mg Oral Q4H PRN [place] Shiver, PA-C      aluminum-magnesium hydroxide-simethicone  30 mL Oral Q4H PRN [place] Shiver, PA-C      benztropine  1 mg Intramuscular Q6H PRN [place] Shiver, PA-C      benztropine  1 mg Oral Q6H PRN [place] Shiver, PA-C      FLUoxetine  20 mg Oral Daily Casie Glimpse, DO      gabapentin  600 mg Oral TID Casie Glimpse, DO      haloperidol  5 mg Oral Q6H PRN [place] Shiver, PA-C      haloperidol lactate  5 mg Intramuscular Q6H PRN [place] Shiver, PA-C      hydrOXYzine HCL  25 mg Oral Q6H PRN [place] Shiver, PA-C      hydrOXYzine HCL  50 mg Oral Q6H PRN [place] Shiver, PA-C      ibuprofen  600 mg Oral Q6H PRN [place] Shiver, PA-C      LORazepam  2 mg Intramuscular Q6H PRN [place] Shiver, PA-C      LORazepam  1 mg Oral Q6H PRN [place] Shiver, PA-C      magnesium hydroxide  30 mL Oral Daily PRN [place] Shiver, PA-C      nicotine  1 patch Transdermal Daily Casie Glimpse, DO      nicotine polacrilex  4 mg Oral Q2H PRN Casie Glimpse, DO      OLANZapine  10 mg Intramuscular Q8H PRN [place] Shiver, PA-C      OLANZapine  10 mg Oral Q8H PRN Zerita ApoRADHA      QUEtiapine  200 mg Oral HS Preeti Goff DO      risperiDONE  1 mg Oral Q3H PRN Zerita ApoRADHA      traZODone  50 mg Oral HS PRN Zerita ApoRADHA         1) continue current treatment  2) Continue to support patient and engage them in the programs available as feasible and appropriate  Continue case management support and therapy  Continue discharge planning  Risks / Benefits of Treatment:    Risks, benefits, and possible side effects of medications explained to patient and patient verbalizes understanding and agreement for treatment  Counseling / Coordination of Care:    Medication changes reviewed with nursing staff  Medications, treatment progress and treatment plan reviewed with patient        Naya Garcia III, DO  8/15/2020  7:56 AM

## 2020-08-15 NOTE — QUICK NOTE
Pt with increased irritability, demanding staff to remove another pt due to disrupting a conversation with a peer  Staff attempting to verbally de-escalate pt  Pt was agreeable to taking PRN and received haloperidal 5mg po at 1058  Upon reassessment, pt appears calm, requested ice for Right hand, no other complaints at this time

## 2020-08-16 PROCEDURE — 99232 SBSQ HOSP IP/OBS MODERATE 35: CPT | Performed by: PSYCHIATRY & NEUROLOGY

## 2020-08-16 RX ADMIN — HYDROXYZINE HYDROCHLORIDE 50 MG: 50 TABLET, FILM COATED ORAL at 08:51

## 2020-08-16 RX ADMIN — GABAPENTIN 600 MG: 300 CAPSULE ORAL at 08:50

## 2020-08-16 RX ADMIN — FLUOXETINE 20 MG: 20 CAPSULE ORAL at 08:50

## 2020-08-16 RX ADMIN — NICOTINE POLACRILEX 4 MG: 4 GUM, CHEWING BUCCAL at 13:20

## 2020-08-16 RX ADMIN — GABAPENTIN 600 MG: 300 CAPSULE ORAL at 15:43

## 2020-08-16 RX ADMIN — IBUPROFEN 600 MG: 600 TABLET ORAL at 12:17

## 2020-08-16 RX ADMIN — GABAPENTIN 600 MG: 300 CAPSULE ORAL at 21:36

## 2020-08-16 RX ADMIN — QUETIAPINE FUMARATE 200 MG: 200 TABLET ORAL at 21:36

## 2020-08-16 RX ADMIN — NICOTINE POLACRILEX 4 MG: 4 GUM, CHEWING BUCCAL at 09:12

## 2020-08-16 NOTE — TREATMENT TEAM
AM rounds: patient denies SI  Anxiety in AM typical  Some irritability with a peer, took haldol to calm  Slept

## 2020-08-16 NOTE — QUICK NOTE
Pt received Atarax 50mg at 0851 for moderate anxiety, story = 20  Upon follow up, pt reports medication to be mildly effective, declined further intervention

## 2020-08-16 NOTE — PLAN OF CARE
Problem: Depression  Goal: Treatment Goal: Demonstrate behavioral control of depressive symptoms, verbalize feelings of improved mood/affect, and adopt new coping skills prior to discharge  Outcome: Progressing  Goal: Verbalize thoughts and feelings  Description: Interventions:  - Assess and re-assess patient's level of risk   - Engage patient in 1:1 interactions, daily, for a minimum of 15 minutes   - Encourage patient to express feelings, fears, frustrations, hopes   Outcome: Progressing  Goal: Refrain from harming self  Description: Interventions:  - Monitor patient closely, per order   - Supervise medication ingestion, monitor effects and side effects   Outcome: Progressing  Goal: Attend and participate in unit activities, including therapeutic, recreational, and educational groups  Description: Interventions:  - Provide therapeutic and educational activities daily, encourage attendance and participation, and document same in the medical record   Outcome: Progressing     Problem: Anxiety  Goal: Anxiety is at manageable level  Description: Interventions:  - Assess and monitor patient's anxiety level  - Monitor for signs and symptoms (heart palpitations, chest pain, shortness of breath, headaches, nausea, feeling jumpy, restlessness, irritable, apprehensive)  - Collaborate with interdisciplinary team and initiate plan and interventions as ordered    - [place] patient to unit/surroundings  - Explain treatment plan  - Encourage participation in care  - Encourage verbalization of concerns/fears  - Identify coping mechanisms  - Assist in developing anxiety-reducing skills  - Administer/offer alternative therapies  - Limit or eliminate stimulants  Outcome: Progressing     Problem: INFECTION - ADULT  Goal: Absence or prevention of progression during hospitalization  Description: INTERVENTIONS:  - Assess and monitor for signs and symptoms of infection  - Monitor lab/diagnostic results  - Monitor all insertion sites, i e  indwelling lines, tubes, and drains  - Monitor endotracheal if appropriate and nasal secretions for changes in amount and color  - [place] appropriate cooling/warming therapies per order  - Administer medications as ordered  - Instruct and encourage patient and family to use good hand hygiene technique  - Identify and instruct in appropriate isolation precautions for identified infection/condition  Outcome: Progressing     Problem: Depression  Goal: Treatment Goal: Demonstrate behavioral control of depressive symptoms, verbalize feelings of improved mood/affect, and adopt new coping skills prior to discharge  Outcome: Progressing  Goal: Verbalize thoughts and feelings  Description: Interventions:  - Assess and re-assess patient's level of risk   - Engage patient in 1:1 interactions, daily, for a minimum of 15 minutes   - Encourage patient to express feelings, fears, frustrations, hopes   Outcome: Progressing  Goal: Refrain from harming self  Description: Interventions:  - Monitor patient closely, per order   - Supervise medication ingestion, monitor effects and side effects   Outcome: Progressing  Goal: Attend and participate in unit activities, including therapeutic, recreational, and educational groups  Description: Interventions:  - Provide therapeutic and educational activities daily, encourage attendance and participation, and document same in the medical record   Outcome: Progressing     Problem: Anxiety  Goal: Anxiety is at manageable level  Description: Interventions:  - Assess and monitor patient's anxiety level  - Monitor for signs and symptoms (heart palpitations, chest pain, shortness of breath, headaches, nausea, feeling jumpy, restlessness, irritable, apprehensive)  - Collaborate with interdisciplinary team and initiate plan and interventions as ordered    - [place] patient to unit/surroundings  - Explain treatment plan  - Encourage participation in care  - Encourage verbalization of concerns/fears  - Identify coping mechanisms  - Assist in developing anxiety-reducing skills  - Administer/offer alternative therapies  - Limit or eliminate stimulants  Outcome: Progressing     Problem: INFECTION - ADULT  Goal: Absence or prevention of progression during hospitalization  Description: INTERVENTIONS:  - Assess and monitor for signs and symptoms of infection  - Monitor lab/diagnostic results  - Monitor all insertion sites, i e  indwelling lines, tubes, and drains  - Monitor endotracheal if appropriate and nasal secretions for changes in amount and color  - [place] appropriate cooling/warming therapies per order  - Administer medications as ordered  - Instruct and encourage patient and family to use good hand hygiene technique  - Identify and instruct in appropriate isolation precautions for identified infection/condition  Outcome: Progressing

## 2020-08-16 NOTE — PROGRESS NOTES
Visible in the milieu, social with peers  Pleaant and cooperative  Denies SI  Compliant with scheduled medicaitons  Denies questions/concerns

## 2020-08-16 NOTE — PROGRESS NOTES
Progress Note - Behavioral Health     Micki Sullivan 42 y o  male MRN: @MRN   Unit/Bed#: [facility] 540-09 Encounter: [id number]    Per nursing report and sign out, patient denies SI  Anxiety in AM typical  Some irritability with a peer, took haldol to calm  Slept  Micki Sullivan reports today that he slept a lot yesterday,a nd last night  But feels great  Notes he was seeing flashes (faint) at night last 2 night  No pain, other than from hitting head before  2/10 pain today  Depression 5/10, anxiety 5/10  No SI or HI  No AVH or other issues  Talking about therapy dogs, interest  He will f/u       Energy: ok today  Sleep: normal  Appetite: normal  Medication side effects: No   ROS: all other systems are negative    Mental Status Evaluation:    Appearance:  age appropriate   Behavior:  pleasant, cooperative, with good eye contact   Speech:  Normal volume, regular rate and rhythm   Mood:  depressed and anxious   Affect:  brighter with some improvement       Thought Process:  Linear and goal directed   Associations: intact associations   Thought Content:  normal and appropriate, negative thinking and cognitive distortions   Perceptual Disturbances: no auditory or visual hallcunations   Risk Potential: Suicidal ideation - None  Homicidal ideation - None  Potential for aggression - No   Sensorium:  A&Ox3   Cognition:  grossly intact   Consciousness:  Alert & Awake   Memory:  recent and remote memory grossly intact   Attention: attention span and concentration are age appropriate           Insight:  improving   Judgment: improving   Muscle Strength  Muscle Tone normal  normal   Gait/Station: normal gait/station with good balance   Motor Activity: no abnormal movements       Vital signs in last 24 hours:    Temp:  [97 4 °F (36 3 °C)-98 4 °F (36 9 °C)] 97 4 °F (36 3 °C)  HR:  [63-89] 63  Resp:  [16-17] 16  BP: (110-122)/(63) 110/63    Laboratory results:  I have personally reviewed all pertinent laboratory/tests results  Assessment/Plan   Principal Problem:    Mood disorder [facility])  Active Problems:    Anxiety disorder    PTSD (post-traumatic stress disorder)    Medical clearance for psychiatric admission      Ravinder Hercules is improving    Recommended Treatment:     Planned medication and treatment changes: All current active medications have been reviewed    Encourage group therapy, milieu therapy and occupational therapy  [address] checks as unit standard unless ordered or noted otherwise    Current Facility-Administered Medications   Medication Dose Route Frequency Provider Last Rate    acetaminophen  325 mg Oral Q6H PRN Malachy Murrain, PA-C      acetaminophen  650 mg Oral Q4H PRN Malachy Murrain, PA-C      aluminum-magnesium hydroxide-simethicone  30 mL Oral Q4H PRN Malachy Murrain, PA-C      benztropine  1 mg Intramuscular Q6H PRN Malachy Murrain, PA-C      benztropine  1 mg Oral Q6H PRN Malachy Murrain, PA-C      FLUoxetine  20 mg Oral Daily Robert , DO      gabapentin  600 mg Oral TID Robert , DO      haloperidol  5 mg Oral Q6H PRN Malachy Murrain, PA-C      haloperidol lactate  5 mg Intramuscular Q6H PRN Malachy Murrain, PA-C      hydrOXYzine HCL  25 mg Oral Q6H PRN Malachy Murrain, PA-C      hydrOXYzine HCL  50 mg Oral Q6H PRN Malachy Murrain, PA-C      ibuprofen  600 mg Oral Q6H PRN Malachy Murrain, PA-C      LORazepam  2 mg Intramuscular Q6H PRN Malachy Murrain, PA-C      LORazepam  1 mg Oral Q6H PRN Malachy Murrain, PA-C      magnesium hydroxide  30 mL Oral Daily PRN Malachy Murrain, PA-C      nicotine  1 patch Transdermal Daily Carlton , DO      nicotine polacrilex  4 mg Oral Q2H PRN Robert , DO      OLANZapine  10 mg Intramuscular Q8H PRN Malachy Murrain, PA-C      OLANZapine  10 mg Oral Q8H PRN Malachy Murrain, PA-C      QUEtiapine  200 mg Oral HS Carlton , DO      risperiDONE  1 mg Oral Q3H PRN Zunilda Matas [place] PA-C      traZODone  50 mg Oral HS PRN Chip Steiner PA-C         1) continue current treatment  2) Continue to support patient and engage them in the programs available as feasible and appropriate  Continue case management support and therapy  Continue discharge planning  Risks / Benefits of Treatment:    Risks, benefits, and possible side effects of medications explained to patient and patient verbalizes understanding and agreement for treatment  Counseling / Coordination of Care:    Medication changes reviewed with nursing staff  Medications, treatment progress and treatment plan reviewed with patient        Jaylene Galaviz III, DO  8/16/2020  8:10 AM

## 2020-08-16 NOTE — PROGRESS NOTES
Calm and cooperative  Visible in the milieu, social with peers  Attended and participated in groups  Reports anxiety is not as elevated this morning as has been in the past few days and was able to utilize hydroxyzine-50mg instead of lorazepam-1mg  Denies SI  Denies concerns or questions

## 2020-08-17 PROCEDURE — 99232 SBSQ HOSP IP/OBS MODERATE 35: CPT | Performed by: PHYSICIAN ASSISTANT

## 2020-08-17 RX ADMIN — GABAPENTIN 600 MG: 300 CAPSULE ORAL at 08:03

## 2020-08-17 RX ADMIN — HYDROXYZINE HYDROCHLORIDE 50 MG: 50 TABLET, FILM COATED ORAL at 08:04

## 2020-08-17 RX ADMIN — NICOTINE POLACRILEX 4 MG: 4 GUM, CHEWING BUCCAL at 09:29

## 2020-08-17 RX ADMIN — GABAPENTIN 600 MG: 300 CAPSULE ORAL at 21:11

## 2020-08-17 RX ADMIN — GABAPENTIN 600 MG: 300 CAPSULE ORAL at 15:42

## 2020-08-17 RX ADMIN — NICOTINE POLACRILEX 4 MG: 4 GUM, CHEWING BUCCAL at 16:56

## 2020-08-17 RX ADMIN — FLUOXETINE 20 MG: 20 CAPSULE ORAL at 08:03

## 2020-08-17 RX ADMIN — QUETIAPINE FUMARATE 200 MG: 200 TABLET ORAL at 21:11

## 2020-08-17 NOTE — PROGRESS NOTES
Pleasant and appropriate  Attends groups, social with peers  Denies SI/HI  Fluctuating anxiety  Compliant with medications, denies side effects

## 2020-08-17 NOTE — PROGRESS NOTES
Pt has been visible and social with peers on the unit  Improvement in anxiety, though it does fluctuate  Attends groups, denies SI, HI, AVH

## 2020-08-17 NOTE — PROGRESS NOTES
Progress Note - Behavioral Health   Magdalena Pall 42 y o  male MRN: [id number]  Unit/Bed#: [facility] 260-33 Encounter: [id number]    Assessment/Plan   Principal Problem:    Mood disorder [facility])  Active Problems:    Anxiety disorder    PTSD (post-traumatic stress disorder)    Medical clearance for psychiatric admission      Subjective:  Patient reports that his anxiety is more under control  Does say he feels little bit restless at times but does not appear to be a side effect of Seroquel  Reports ability to concentrate is improved  Reports depression has decreased and is denying suicidal thoughts  No thoughts of self-harm  Sleep is improved but does report some disturbances at times  States he does see flashing lights and feels that things are spinning when he closes his eyes  Appears energy is improved and is seen attending groups  Denied PTSD related symptoms  Denied psychotic symptoms  Did not appear to endorse criteria for luis manuel  No agitation  Reports that his mood is more stable and does not feel irritable  Medication compliant  Appears to be tolerating medications without serious side effects      Current Medications:  Current Facility-Administered Medications   Medication Dose Route Frequency    acetaminophen (TYLENOL) tablet 325 mg  325 mg Oral Q6H PRN    acetaminophen (TYLENOL) tablet 650 mg  650 mg Oral Q4H PRN    aluminum-magnesium hydroxide-simethicone (MYLANTA) 200-200-20 mg/5 mL oral suspension 30 mL  30 mL Oral Q4H PRN    benztropine (COGENTIN) injection 1 mg  1 mg Intramuscular Q6H PRN    benztropine (COGENTIN) tablet 1 mg  1 mg Oral Q6H PRN    FLUoxetine (PROzac) capsule 20 mg  20 mg Oral Daily    gabapentin (NEURONTIN) capsule 600 mg  600 mg Oral TID    haloperidol (HALDOL) tablet 5 mg  5 mg Oral Q6H PRN    haloperidol lactate (HALDOL) injection 5 mg  5 mg Intramuscular Q6H PRN    hydrOXYzine HCL (ATARAX) tablet 25 mg  25 mg Oral Q6H PRN    hydrOXYzine HCL (ATARAX) tablet 50 mg 50 mg Oral Q6H PRN    ibuprofen (MOTRIN) tablet 600 mg  600 mg Oral Q6H PRN    LORazepam (ATIVAN) injection 2 mg  2 mg Intramuscular Q6H PRN    LORazepam (ATIVAN) tablet 1 mg  1 mg Oral Q6H PRN    magnesium hydroxide (MILK OF MAGNESIA) 400 mg/5 mL oral suspension 30 mL  30 mL Oral Daily PRN    nicotine (NICODERM CQ) 21 mg/24 hr TD 24 hr patch 1 patch  1 patch Transdermal Daily    nicotine polacrilex (NICORETTE) gum 4 mg  4 mg Oral Q2H PRN    OLANZapine (ZyPREXA) IM injection 10 mg  10 mg Intramuscular Q8H PRN    OLANZapine (ZyPREXA) tablet 10 mg  10 mg Oral Q8H PRN    QUEtiapine (SEROquel) tablet 200 mg  200 mg Oral HS    risperiDONE (RisperDAL M-TABS) dispersible tablet 1 mg  1 mg Oral Q3H PRN    traZODone (DESYREL) tablet 50 mg  50 mg Oral HS PRN       Behavioral Health Medications: all current active meds have been reviewed and continue current psychiatric medications  Vitals:  Vitals:    08/17/20 0720   BP: 152/81   Pulse: 77   Resp: 18   Temp: 98 3 °F (36 8 °C)   SpO2:        Laboratory results:    I have personally reviewed all pertinent laboratory/tests results    Most Recent Labs:   Lab Results   Component Value Date    WBC 8 54 08/13/2020    RBC 4 98 08/13/2020    HGB 15 4 08/13/2020    HCT 45 4 08/13/2020     08/13/2020    RDW 12 6 08/13/2020    NEUTROABS 4 33 08/13/2020    SODIUM 142 08/13/2020    K 3 4 (L) 08/13/2020     08/13/2020    CO2 31 08/13/2020    BUN 13 08/13/2020    CREATININE 1 03 08/13/2020    GLUC 91 08/13/2020    CALCIUM 8 3 08/13/2020    AST 14 08/13/2020    ALT 21 08/13/2020    ALKPHOS 55 08/13/2020    TP 6 8 08/13/2020    ALB 3 3 (L) 08/13/2020    TBILI 0 40 08/13/2020    CHOLESTEROL 135 08/13/2020    HDL 42 08/13/2020    TRIG 86 08/13/2020    LDLCALC 76 08/13/2020    NONHDLC 93 08/13/2020    LXA1EJBRJEBK 1 010 08/13/2020    RPR Non-Reactive 08/13/2020    HGBA1C 5 0 08/13/2020    EAG 97 08/13/2020       Psychiatric Review of Systems:  Behavior over the last 24 hours:  improved  Sleep: improved  Appetite: normal  Medication side effects: No  ROS: no complaints, all others negative    Mental Status Evaluation:  Appearance:  casually dressed   Behavior:  cooperative   Speech:  normal pitch and normal volume   Mood:  less depressed and anxious   Affect:  mood-congruent   Language appropriate   Thought Process:  logical   Thought Content:  normal   Perceptual Disturbances: None   Risk Potential: Denied SI/HI  Potential for aggression: no   Sensorium:  person, place and time/date   Cognition:  recent and remote memory grossly intact   Consciousness:  alert and awake    Attention: attention span and concentration were age appropriate   Insight:  fair   Judgment: fair   Gait/Station: normal gait/station and normal balance   Motor Activity: no abnormal movements     Progress Toward Goals: progressing    Recommended Treatment: Continue with group therapy, milieu therapy and occupational therapy  1   Continue current medications  2  Disposition planning with tentative discharge tomorrow     Risks, benefits and possible side effects of Medications:   Risks, benefits, and possible side effects of medications explained to patient and patient verbalizes understanding        Christian Russell PA-C

## 2020-08-17 NOTE — CASE MANAGEMENT
CM met with Pt who reported that he was feeling a lot better & was ready for d/c tomorrow  CM reviewed with Pt his appointment on Weds with Dr Andrea Montez & that he will have to follow-up with her about therapy & groups, as CM was not able to refer him directly  Pt verbalized understanding & confirmed that his mom could provide transportation home  CM contacted Pt's mom, Sammi Escalera, @ [phone] to review d/c plans for tomorrow  She reported that Pt does sound more like himself & said that he is feeling a lot better    She said that he still gets very angry over the simplest things, but that may not be fixed by being in the hospital   She agreed to provide transportation tomorrow at 10 AM

## 2020-08-17 NOTE — PROGRESS NOTES
08/17/20 1000 08/17/20 1100 08/17/20 1315   Activity/Group Checklist   Group Community meeting  (motivational goal setting) Nursing Education  (How to Control and Prevent Anger) Life Skills  (Healthy lifestyle)   Attendance Attended Attended Attended   Attendance Duration (min) 31-45 16-30 Greater than 60   Interactions Interacted appropriately Interacted appropriately Interacted appropriately   Affect/Mood Appropriate Appropriate Appropriate   Goals Achieved Identified feelings; Identified triggers; Discussed coping strategies; Discussed self-esteem issues; Discussed discharge plans; Able to listen to others; Able to engage in interactions; Able to reflect/comment on own behavior;Able to self-disclose; Able to recieve feedback; Able to manage/cope with feelings Identified triggers; Able to engage in interactions; Able to listen to others; Identified feelings Identified feelings; Identified triggers; Identified relapse prevention strategies; Discussed coping strategies; Discussed self-esteem issues; Identified distorted thoughts/beliefs; Identified resources and support systems; Able to listen to others; Able to engage in interactions; Able to reflect/comment on own behavior;Able to self-disclose; Able to recieve feedback; Able to give feedback to another; Other (Comment)  (insightful about barriers & goals for healthy lifestyle )   Devon Mortimer attended 3/3 groups today  He continues to fully engage in therapeutic activities, topics, and discussions  Pt verbalizes eagerness for discharge and identifies the desire to focus on himself and his personal recovery  He displays notably more insight and acceptance with his relationship to his significant other then when first admitted  Pt appears to be insightful and invested in taking the next steps in his recovery and follow up care

## 2020-08-17 NOTE — PLAN OF CARE
Problem: Ineffective Coping  Goal: Identifies ineffective coping skills  Outcome: Progressing  Goal: Identifies healthy coping skills  Outcome: Progressing     Problem: Depression  Goal: Treatment Goal: Demonstrate behavioral control of depressive symptoms, verbalize feelings of improved mood/affect, and adopt new coping skills prior to discharge  Outcome: Progressing  Goal: Verbalize thoughts and feelings  Description: Interventions:  - Assess and re-assess patient's level of risk   - Engage patient in 1:1 interactions, daily, for a minimum of 15 minutes   - Encourage patient to express feelings, fears, frustrations, hopes   Outcome: Progressing  Goal: Refrain from harming self  Description: Interventions:  - Monitor patient closely, per order   - Supervise medication ingestion, monitor effects and side effects   Outcome: Progressing  Goal: Attend and participate in unit activities, including therapeutic, recreational, and educational groups  Description: Interventions:  - Provide therapeutic and educational activities daily, encourage attendance and participation, and document same in the medical record   Outcome: Progressing     Problem: Anxiety  Goal: Anxiety is at manageable level  Description: Interventions:  - Assess and monitor patient's anxiety level  - Monitor for signs and symptoms (heart palpitations, chest pain, shortness of breath, headaches, nausea, feeling jumpy, restlessness, irritable, apprehensive)  - Collaborate with interdisciplinary team and initiate plan and interventions as ordered    - [place] patient to unit/surroundings  - Explain treatment plan  - Encourage participation in care  - Encourage verbalization of concerns/fears  - Identify coping mechanisms  - Assist in developing anxiety-reducing skills  - Administer/offer alternative therapies  - Limit or eliminate stimulants  Outcome: Progressing     Problem: INFECTION - ADULT  Goal: Absence or prevention of progression during hospitalization  Description: INTERVENTIONS:  - Assess and monitor for signs and symptoms of infection  - Monitor lab/diagnostic results  - Monitor all insertion sites, i e  indwelling lines, tubes, and drains  - Monitor endotracheal if appropriate and nasal secretions for changes in amount and color  - [place] appropriate cooling/warming therapies per order  - Administer medications as ordered  - Instruct and encourage patient and family to use good hand hygiene technique  - Identify and instruct in appropriate isolation precautions for identified infection/condition  Outcome: Progressing     Problem: Ineffective Coping  Goal: Participates in unit activities  Description: Interventions:  - Provide therapeutic environment   - Provide required programming   - Redirect inappropriate behaviors   Outcome: Progressing

## 2020-08-17 NOTE — DISCHARGE INSTR - OTHER ORDERS
Nathaniel Cisneros 35: [phone]  Call the [phone] if you are seeking help for yourself, a family member, a friend, etc  (available 24-Hours a day, 7 days a week) to obtain the following service(s):  · Information, consultation, guidance, technical advice and referral  · A Mobile Crisis Team for on-site assessment and intervention  · Referral to a nearby [facility] for evaluation, treatment and recovery services  · Deployment of a Crisis Specialist to provide appropriate support and related service for a limited period of time to a person in their own home who is experiencing a significant crisis  · Applications for Involuntary Emergency Examination and Treatment for Persons who have a mental disability, are a danger to themselves or others and refuse to accept treatment  · A short term mental health crisis residence providing intervention for various psychiatric stress conditions  · A Community Response Team for communities experiencing violence and other traumatic events to foster resilience by offering support, crisis counseling, psychological first aid, psycho-education and referral for other appropriate services  24-Hour Suicide Prevention and Crisis Intervention Service  Help is available 24 Hours a Day, 7 days a week  [facility] operates a 24-hour call center to assist people and their families with behavioral health crises    Callers needing help or persons wanting to obtain help for others can call [phone] at any time of the day or night for the following problems:  · Depression  · Feelings or thoughts of wanting to harm themselves or others  · Feelings of hopelessness, worthlessness or that no one cares about them  · Difficulty dealing with various life stresses  · Anger issues  · Addictions and substance use problems  · Relationship problems  · Other emotional issues or stresses  Compassionate, trained professionals answer all calls 24-hours a day, 7 days a week  Callers will have the opportunity to discuss their problem and will receive appropriate counseling, guidance, assessment and referral for prompt treatment or other services if necessary  Help is just a phone call ETKW[phone]  Important Numbers:  24/Hr Suicide Prevention and Crisis Intervention Service [address] [facility] [phone])    What you need to know aboutcoronavirus disease 2019 (COVID-19)     What is coronavirus disease 2019 (COVID-19)? Coronavirus disease 2019 (COVID-19) is a respiratory illness that can spread from person to person  The virus that causes COVID-19 is a novel coronavirus that was first identified during an investigation into an outbreak in [place], [place]  Can people in the [place]  get COVID[place]19? Yes  COVID-19 is spreading from person to person in parts of the [place]  Risk of infection with COVID-19 is higher for people who are close contacts of someone known to have COVID-19, for example healthcare workers, or household members  Other people at higher risk for infection are those who live in or have recently been in an area with ongoing spread of COVID-19  Learn more about places with ongoing spread at   PreviewBuy tn  html#geographic  Have there been cases of COVID-19 in the [place] ?   Yes  The first case of COVID-19 in the [place] was reported on January 21, 2020  The current count of cases of COVID-19 in the [place] is available on [employer] at [facility]  How does COVID-19 spread? The virus that causes COVID-19 probably emerged from an animal source, but is now spreading from person to person  The virus is thought to spread mainly between people who are in close contact with one another (within about 6 feet) through respiratory droplets produced when an infected person coughs or sneezes   It also may be possible that a person can get COVID-19 by touching a surface or object that has the virus on it and then touching their own mouth, nose, or possibly their eyes, but this is not thought to be the main way the virus spreads  Learn what is known about the spread of newly emerged coronaviruses at [facility]  What are the symptoms of COVID-19? Patients with COVID-19 have had mild to severe respiratory illness with symptoms of   fever   cough   shortness of breath  What are severe complications from this virus? Some patients have pneumonia in both lungs, multi-organ failure and in some cases death  How can I help protect myself? People can help protect themselves from respiratory illness with everyday preventive actions  Avoid close contact with people who are sick  Avoid touching your eyes, nose, and mouth withunwashed hands  Wash your hands often with soap and water for at least 20 seconds  Use an alcohol-based hand  that contains at least 60% alcohol if soap and water are not available  If you are sick, to keep from spreading respiratory illness to others, you should   Stay home when you are sick  Cover your cough or sneeze with a tissue, then throw the tissue in the trash  Clean and disinfect frequently touched objectsand surfaces  What should I do if I recently traveled from an area with ongoing spread of COVID-19? If you have traveled from an affected area, there may be restrictions on your movements for up to 2 weeks  If you develop symptoms during that period (fever, cough, trouble breathing), seek medical advice  Call the office of your health care provider before you go, and tell them about your travel and your symptoms  They will give you instructions on how to get care without exposing other people to your illness   While sick, avoid contact with people, don't go out and delay any travel to reduce the possibility of spreading illness to others  Is there a vaccine? There is currently no vaccine to protect against COVID-19  The best way to prevent infection is to take everyday preventive actions, like avoiding close contact with people who are sick and washing your hands often  Is there a treatment? There is no specific antiviral treatment for COVID-19  People with COVID-19 can seek medical care to helprelieve symptoms  For more information: www cdc gov/VFYUF80KN [id number]-R 03/03/2020       What to do if you are sick withcoronavirus disease 2019 (COVID-19)     If you are sick with COVID-19 or suspect you are infected with the virus that causes COVID-19, follow the steps below to help prevent the disease from spreading to people in your home and community  Stay home except to get medical care   You should restrict activities outside your home, except for getting medical care  Do not go to work, school, or public areas  Avoid using public transportation, ride-sharing, or taxis  Separate yourself from other people and animals inyour home  People: As much as possible, you should stay in a specific room and away from other people in your home  Also, you should use a separate bathroom, if available  Animals: Do not handle pets or other animals while sick  See COVID-19 and Animals for more information  Call ahead before visiting your doctor   If you have a medical appointment, call the healthcare provider and tell them that you have or may have COVID-19  This will help the healthcare provider's office take steps to keep other people from getting infected or exposed  Wear a facemask  You should wear a facemask when you are around other people (e g , sharing a room or vehicle) or pets and before you enter a healthcare provider's office   If you are not able to wear a facemask (for example, because it causes trouble breathing), then people who live with you should not stay in the same room with you, or they should wear a facemask if they enteryour room  Cover your coughs and sneezes   Cover your mouth and nose with a tissue when you cough or sneeze  Throw used tissues in a lined trash can; immediately wash your hands with soap and water for at least 20 seconds or clean your hands with an alcohol-based hand  that contains at least 60 to 95% alcohol, covering all surfaces of your hands and rubbing them together until they feel dry  Soap and water should be used preferentially if hands are visibly dirty  Avoid sharing personal household items   You should not share dishes, drinking glasses, cups, eating utensils, towels, or bedding with other people or pets in your home  After using these items, they should be washed thoroughly with soap and water  Clean your hands often  Wash your hands often with soap and water for at least 20 seconds  If soap and water are not available, clean your hands with an alcohol-based hand  that contains at least 60% alcohol, covering all surfaces of your hands and rubbing them together until they feel dry  Soap and water should be used preferentially if hands are visibly dirty  Avoid touching your eyes, nose, and mouth with unwashed hands  Clean all "high-touch" surfaces every day  High touch surfaces include counters, tabletops, doorknobs, bathroom fixtures, toilets, phones, keyboards, tablets, and bedside tables  Also, clean any surfaces that may have blood, stool, or body fluids on them  Use a household cleaning spray or wipe, according to the label instructions  Labels contain instructions for safe and effective use of the cleaning product including precautions you should take when applying the product, such as wearing gloves and making sure you have good ventilation during use of the product  Monitor your symptoms  Seek prompt medical attention if your illness is worsening (e g , difficulty breathing)   Before seeking care, call your healthcare provider and tell them that you have, or are being evaluated for, COVID-19  Put on a facemask before you enter the facility  These steps will help the healthcare provider's office to keep other people in the office or waiting room from getting infectedor exposed  Ask your healthcare provider to call the local or state health department  Persons who are placed under active monitoring or facilitated self-monitoring should follow instructions provided by their local health department or occupational health professionals, as appropriate  If you have a medical emergency and need to call 911, notify the dispatch personnel that you have, or are being evaluated for COVID-19  If possible, put on a facemask before emergency medical services arrive  Discontinuing home isolation  Patients with confirmed COVID-19 should remain under home isolation precautions until the risk of secondary transmission to others is thought to be low  The decision to discontinue home isolation precautions should be made on a case-by-case basis, in consultation with healthcare providers and [facility] health departments  For more information: www cdc gov/LAQUU81RX 284489-O 02/24/2020       Stay home when you are sick,except to get medical care  Wash your hands often with soap and water for at least 20 seconds  Cover your cough or sneeze with a tissue, then throw the tissue in the trash  Clean and disinfect frequently touched objects and surfaces  Avoid touching your eyes, nose, and mouth  STOP THE SPREAD OF GERMS  For more information: www cdc gov/COVID19 Avoid close contact with people who are sick  Help prevent the spread of respiratory diseases like COVID-19

## 2020-08-17 NOTE — DISCHARGE INSTR - APPOINTMENTS
Asha Freeman RN, our [employer], will be calling you after your discharge, on the phone number that you provided  She will be available as an additional support, if needed  If you wish to speak with her, you may contact Jessika Fall at [phone]

## 2020-08-17 NOTE — TREATMENT TEAM
08/17/20 0843   Team Meeting   Meeting Type Daily Rounds   Team Members Present   Team Members Present Physician;Nurse;;Occupational Therapist   Physician Team Member Dr Madonna WILKINSON   Nursing Team Member [place], [place]   Care Management Team Member Irwin Gurjitde   OT Team Member    AM rounds per report from previous shift: denies SI/HI, social, visible, received atarax PRN which is effective for anxiety, slept throughout the night

## 2020-08-17 NOTE — CASE MANAGEMENT
SHWETA received a call from Yvon Sadler at the [place] who reported that Pt would have to talk to his psychiatrist, Dr Elvia Huynh on Weds if he would like to see a therapist or do any groups  SHWETA was provided fax number [phone] to send discharge information & was told that Pt see the doctor at the [facility] location )

## 2020-08-17 NOTE — QUICK NOTE
Pt reports increased anxiety this morning, though feels it is improving since weekend  Received PRN hydroxyzine 50mg po at 0804  Upon reassessment, pt expresses improvement

## 2020-08-18 VITALS
HEIGHT: 70 IN | DIASTOLIC BLOOD PRESSURE: 91 MMHG | OXYGEN SATURATION: 96 % | HEART RATE: 108 BPM | BODY MASS INDEX: 26.29 KG/M2 | SYSTOLIC BLOOD PRESSURE: 135 MMHG | TEMPERATURE: 96.8 F | WEIGHT: 183.64 LBS | RESPIRATION RATE: 18 BRPM

## 2020-08-18 PROCEDURE — 99239 HOSP IP/OBS DSCHRG MGMT >30: CPT | Performed by: PHYSICIAN ASSISTANT

## 2020-08-18 RX ORDER — QUETIAPINE FUMARATE 200 MG/1
200 TABLET, FILM COATED ORAL
Qty: 30 TABLET | Refills: 2 | Status: SHIPPED | OUTPATIENT
Start: 2020-08-18

## 2020-08-18 RX ORDER — FLUOXETINE HYDROCHLORIDE 20 MG/1
20 CAPSULE ORAL DAILY
Qty: 30 CAPSULE | Refills: 2 | Status: SHIPPED | OUTPATIENT
Start: 2020-08-19

## 2020-08-18 RX ORDER — HYDROXYZINE 50 MG/1
50 TABLET, FILM COATED ORAL EVERY 8 HOURS PRN
Qty: 30 TABLET | Refills: 1 | Status: SHIPPED | OUTPATIENT
Start: 2020-08-18

## 2020-08-18 RX ORDER — GABAPENTIN 300 MG/1
600 CAPSULE ORAL 3 TIMES DAILY
Qty: 180 CAPSULE | Refills: 2 | Status: SHIPPED | OUTPATIENT
Start: 2020-08-18

## 2020-08-18 RX ADMIN — NICOTINE POLACRILEX 4 MG: 4 GUM, CHEWING BUCCAL at 08:52

## 2020-08-18 RX ADMIN — GABAPENTIN 600 MG: 300 CAPSULE ORAL at 08:13

## 2020-08-18 RX ADMIN — FLUOXETINE 20 MG: 20 CAPSULE ORAL at 08:13

## 2020-08-18 NOTE — TREATMENT TEAM
08/18/20 0800   Team Meeting   Meeting Type Daily Rounds   Team Members Present   Team Members Present Physician;Nurse;;Occupational Therapist   Physician Team Member Dr Jaylene WILKINSON   Nursing Team Member , [place]   [facility]   OT Team Member [facility]   AM rounds per report from previous shift:  Denies SI/HI, present in milieu, fluctuating anxiety  Woke this AM with mild anxiety and was going to try and manage without PRN  Discharge today

## 2020-08-18 NOTE — PLAN OF CARE
Problem: Ineffective Coping  Goal: Identifies ineffective coping skills  Outcome: Adequate for Discharge  Goal: Identifies healthy coping skills  Outcome: Adequate for Discharge     Problem: Depression  Goal: Treatment Goal: Demonstrate behavioral control of depressive symptoms, verbalize feelings of improved mood/affect, and adopt new coping skills prior to discharge  Outcome: Adequate for Discharge  Goal: Verbalize thoughts and feelings  Description: Interventions:  - Assess and re-assess patient's level of risk   - Engage patient in 1:1 interactions, daily, for a minimum of 15 minutes   - Encourage patient to express feelings, fears, frustrations, hopes   Outcome: Adequate for Discharge  Goal: Refrain from harming self  Description: Interventions:  - Monitor patient closely, per order   - Supervise medication ingestion, monitor effects and side effects   Outcome: Adequate for Discharge  Goal: Attend and participate in unit activities, including therapeutic, recreational, and educational groups  Description: Interventions:  - Provide therapeutic and educational activities daily, encourage attendance and participation, and document same in the medical record   Outcome: Adequate for Discharge     Problem: Anxiety  Goal: Anxiety is at manageable level  Description: Interventions:  - Assess and monitor patient's anxiety level  - Monitor for signs and symptoms (heart palpitations, chest pain, shortness of breath, headaches, nausea, feeling jumpy, restlessness, irritable, apprehensive)  - Collaborate with interdisciplinary team and initiate plan and interventions as ordered    - [place] patient to unit/surroundings  - Explain treatment plan  - Encourage participation in care  - Encourage verbalization of concerns/fears  - Identify coping mechanisms  - Assist in developing anxiety-reducing skills  - Administer/offer alternative therapies  - Limit or eliminate stimulants  Outcome: Adequate for Discharge     Problem: INFECTION - ADULT  Goal: Absence or prevention of progression during hospitalization  Description: INTERVENTIONS:  - Assess and monitor for signs and symptoms of infection  - Monitor lab/diagnostic results  - Monitor all insertion sites, i e  indwelling lines, tubes, and drains  - Monitor endotracheal if appropriate and nasal secretions for changes in amount and color  - [place] appropriate cooling/warming therapies per order  - Administer medications as ordered  - Instruct and encourage patient and family to use good hand hygiene technique  - Identify and instruct in appropriate isolation precautions for identified infection/condition  Outcome: Adequate for Discharge     Problem: DISCHARGE PLANNING  Goal: Discharge to home or other facility with appropriate resources  Description: INTERVENTIONS:  - Identify barriers to discharge w/patient and caregiver  - Arrange for needed discharge resources and transportation as appropriate  - Identify discharge learning needs (meds, wound care, etc )  - Arrange for interpretive services to assist at discharge as needed  - Refer to Case Management Department for coordinating discharge planning if the patient needs post-hospital services based on physician/advanced practitioner order or complex needs related to functional status, cognitive ability, or social support system  Outcome: Adequate for Discharge     Problem: Ineffective Coping  Goal: Participates in unit activities  Description: Interventions:  - Provide therapeutic environment   - Provide required programming   - Redirect inappropriate behaviors   Outcome: Adequate for Discharge

## 2020-08-18 NOTE — PROGRESS NOTES
Pleasant and cooperative  Awake and ADLs completed  Restful sleep and woke with mild anxiety which did not require PRN  Denies SI/HI or aggression  Mom is a support and will transport home  Appointment at [place] 8/19/2020 per patient  [place] work [place] airport  Spoke about setting self up with AM routine to help manage AM anxiety  Encouraged compliance with medications and OP appointments  No questions or concerns

## 2020-08-18 NOTE — CASE MANAGEMENT
CM met with Pt who verbalized readiness for discharge  Pt reported he confirmed that his mom will pick him up today & take him to the pharmacy to get his prescriptions filled that are needed; Pt already has some of the medications  Pt said that he will pay out of pocket & then after he sees his [facility] the [facility] tomorrow, he will go through the [facility] to get his medications fill moving forward  Pt said that he plans to stay with family & return to [facility] tomorrow for his appointment with Dr Carmina Gottlieb at 10 AM   CM asked Pt about returning to work & he said that he plans to go back on Thursday, unless Dr Carmina Gottlieb says he should stay out later  Pt was provided a return to work note in his discharge folder  Pt had no questions about his discharge

## 2020-08-18 NOTE — DISCHARGE INSTRUCTIONS
Anxiety   WHAT YOU SHOULD KNOW:   Anxiety is a condition that causes you to feel excessive worry, uneasiness, or fear  Family or work stress, smoking, caffeine, and alcohol can increase your risk for anxiety  Certain medicines or health conditions can also increase your risk  Anxiety may begin gradually, and can become a long-term condition if it is not managed or treated  AFTER YOU LEAVE:   Medicines:   · Medicines  can help you feel more calm and relaxed, and decrease your symptoms  · Take your medicine as directed  Contact your healthcare provider if you think your medicine is not helping or if you have side effects  Tell him if you are allergic to any medicine  Keep a list of the medicines, vitamins, and herbs you take  Include the amounts, and when and why you take them  Bring the list or the pill bottles to follow-up visits  Carry your medicine list with you in case of an emergency  Follow up with your healthcare provider within 2 weeks or as directed:  Write down your questions so you remember to ask them during your visits  Manage anxiety:   · Go to counseling as directed  Cognitive behavioral therapy can help you understand and change how you react to events that trigger your symptoms  · Find ways to manage your symptoms  Activities such as exercise, meditation, or listening to music can help you relax  · Practice deep breathing  Breathing can change how your body reacts to stress  Focus on taking slow, deep breaths several times a day, or during an anxiety attack  Breathe in through your nose, and out through your mouth  · Avoid caffeine  Caffeine can make your symptoms worse  Avoid foods or drinks that are meant to increase your energy level  · Limit or avoid alcohol  Ask your healthcare provider if alcohol is safe for you  You may not be able to drink alcohol if you take certain anxiety or depression medicines  Limit alcohol to 1 drink per day if you are a woman   Limit alcohol to 2 drinks per day if you are a man  A drink of alcohol is 12 ounces of beer, 5 ounces of wine, or 1½ ounces of liquor  Contact your healthcare provider if:   · Your symptoms get worse or do not get better with treatment  · You think your medicine may be causing side effects  · Your anxiety keeps you from doing your regular daily activities  · You have new symptoms since your last visit  · You have questions or concerns about your condition or care  Seek care immediately or call 911 if:   · You have chest pain, tightness, or heaviness that may spread to your shoulders, arms, jaw, neck, or back  · You feel like hurting yourself or someone else  · You feel dizzy, lightheaded, or faint  © 2014 [address] is for End User's use only and may not be sold, redistributed or otherwise used for commercial purposes  All illustrations and images included in CareNotes® are the copyrighted property of A D A M , Inc  or [employer] Ole[employer]  The above information is an  only  It is not intended as medical advice for individual conditions or treatments  Talk to your doctor, nurse or pharmacist before following any medical regimen to see if it is safe and effective for you  Mood Disorders   WHAT YOU NEED TO KNOW:   A mood disorder, or affective disorder, is a condition that causes your mood or emotions to be out of control  Your mood can affect your personality and how you act  It can also affect how you feel about yourself and life in general   DISCHARGE INSTRUCTIONS:   Medicines:   · Medicines  can help control your moods  · Take your medicine as directed  Contact your healthcare provider if you think your medicine is not helping or if you have side effects  Tell him or her if you are allergic to any medicine  Keep a list of the medicines, vitamins, and herbs you take  Include the amounts, and when and why you take them   Bring the list or the pill bottles to follow-up visits  Carry your medicine list with you in case of an emergency  Follow up with your healthcare provider as directed: You may need to return for regular visits or blood tests  Write down your questions so you remember to ask them during your visits  Self-care:   · Try to get 6 to 8 hours of sleep each night  Contact your healthcare provider if you have trouble sleeping  · Manage your stress  Learn new ways to relax, such as deep breathing or meditation  · Talk to someone about how you feel  Join a support group  Talk to your healthcare provider, family, or friends about your feelings  Tell them about things that upset you  · Exercise regularly  Ask about the best exercise plan for you  Most healthcare providers recommend 30 minutes each day, 5 days a week  Exercise helps to lower stress and manage your moods  Contact your healthcare provider if:   · You are depressed  · You feel anxious or worried  · You begin to drink alcohol, or you drink more than usual     · You take illegal drugs  · You take medicines that are not prescribed to you  · Your medicine causes you to feel drowsy, keeps you awake, or affects how much you eat  · You have questions or concerns about your condition or care  Seek care immediately or call 911 if:   · You have severe depression  · You want to hurt yourself or others  © 2017 [address] Information is for End User's use only and may not be sold, redistributed or otherwise used for commercial purposes  All illustrations and images included in CareNotes® are the copyrighted property of A D A M , Inc  or [employer] Olea  The above information is an  only  It is not intended as medical advice for individual conditions or treatments  Talk to your doctor, nurse or pharmacist before following any medical regimen to see if it is safe and effective for you

## 2020-08-18 NOTE — BH TRANSITION RECORD
Contact Information: If you have any questions, concerns, pended studies, tests and/or procedures, or emergencies regarding your inpatient behavioral health visit  Please contact [facility] behavioral health unit [phone] and ask to speak to a , nurse or physician  A contact is available 24 hours/ 7 days a week at this number  Summary of Procedures Performed During your Stay:  Below is a list of major procedures performed during your hospital stay and a summary of results:  - No major procedures performed  Pending Studies (From admission, onward)    None        If studies are pending at discharge, follow up with your PCP and/or referring provider
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
